# Patient Record
Sex: FEMALE | Race: WHITE | Employment: UNEMPLOYED | URBAN - METROPOLITAN AREA
[De-identification: names, ages, dates, MRNs, and addresses within clinical notes are randomized per-mention and may not be internally consistent; named-entity substitution may affect disease eponyms.]

---

## 2019-07-18 ENCOUNTER — HOSPITAL ENCOUNTER (EMERGENCY)
Age: 29
Discharge: HOME | End: 2019-07-18
Attending: EMERGENCY MEDICINE
Payer: COMMERCIAL

## 2019-07-18 ENCOUNTER — APPOINTMENT (OUTPATIENT)
Dept: CT IMAGING | Age: 29
End: 2019-07-18
Attending: PHYSICIAN ASSISTANT
Payer: COMMERCIAL

## 2019-07-18 VITALS
BODY MASS INDEX: 30.61 KG/M2 | HEART RATE: 61 BPM | SYSTOLIC BLOOD PRESSURE: 106 MMHG | HEIGHT: 67 IN | RESPIRATION RATE: 18 BRPM | TEMPERATURE: 98.2 F | DIASTOLIC BLOOD PRESSURE: 60 MMHG | OXYGEN SATURATION: 99 % | WEIGHT: 195 LBS

## 2019-07-18 DIAGNOSIS — G51.0 BELL'S PALSY: Primary | ICD-10-CM

## 2019-07-18 LAB
ALBUMIN SERPL-MCNC: 3.9 G/DL (ref 3.4–5)
ALBUMIN/GLOB SERPL: 1.4 {RATIO} (ref 0.8–1.7)
ALP SERPL-CCNC: 48 U/L (ref 45–117)
ALT SERPL-CCNC: 18 U/L (ref 13–56)
ANION GAP SERPL CALC-SCNC: 4 MMOL/L (ref 3–18)
APPEARANCE UR: CLEAR
AST SERPL-CCNC: 12 U/L (ref 10–38)
BASOPHILS # BLD: 0 K/UL (ref 0–0.1)
BASOPHILS NFR BLD: 0 % (ref 0–2)
BILIRUB SERPL-MCNC: 0.3 MG/DL (ref 0.2–1)
BILIRUB UR QL: NEGATIVE
BUN SERPL-MCNC: 15 MG/DL (ref 7–18)
BUN/CREAT SERPL: 18 (ref 12–20)
CALCIUM SERPL-MCNC: 8.6 MG/DL (ref 8.5–10.1)
CHLORIDE SERPL-SCNC: 108 MMOL/L (ref 100–111)
CO2 SERPL-SCNC: 30 MMOL/L (ref 21–32)
COLOR UR: YELLOW
CREAT SERPL-MCNC: 0.82 MG/DL (ref 0.6–1.3)
DIFFERENTIAL METHOD BLD: ABNORMAL
EOSINOPHIL # BLD: 0.5 K/UL (ref 0–0.4)
EOSINOPHIL NFR BLD: 4 % (ref 0–5)
ERYTHROCYTE [DISTWIDTH] IN BLOOD BY AUTOMATED COUNT: 12.8 % (ref 11.6–14.5)
GLOBULIN SER CALC-MCNC: 2.8 G/DL (ref 2–4)
GLUCOSE SERPL-MCNC: 88 MG/DL (ref 74–99)
GLUCOSE UR STRIP.AUTO-MCNC: NEGATIVE MG/DL
HCG UR QL: NEGATIVE
HCT VFR BLD AUTO: 37.1 % (ref 35–45)
HGB BLD-MCNC: 12 G/DL (ref 12–16)
HGB UR QL STRIP: NEGATIVE
KETONES UR QL STRIP.AUTO: NEGATIVE MG/DL
LEUKOCYTE ESTERASE UR QL STRIP.AUTO: NEGATIVE
LYMPHOCYTES # BLD: 3.7 K/UL (ref 0.9–3.6)
LYMPHOCYTES NFR BLD: 30 % (ref 21–52)
MCH RBC QN AUTO: 28.6 PG (ref 24–34)
MCHC RBC AUTO-ENTMCNC: 32.3 G/DL (ref 31–37)
MCV RBC AUTO: 88.3 FL (ref 74–97)
MONOCYTES # BLD: 0.9 K/UL (ref 0.05–1.2)
MONOCYTES NFR BLD: 7 % (ref 3–10)
NEUTS SEG # BLD: 7.2 K/UL (ref 1.8–8)
NEUTS SEG NFR BLD: 59 % (ref 40–73)
NITRITE UR QL STRIP.AUTO: NEGATIVE
PH UR STRIP: 7.5 [PH] (ref 5–8)
PLATELET # BLD AUTO: 238 K/UL (ref 135–420)
PMV BLD AUTO: 10.5 FL (ref 9.2–11.8)
POTASSIUM SERPL-SCNC: 3.8 MMOL/L (ref 3.5–5.5)
PROT SERPL-MCNC: 6.7 G/DL (ref 6.4–8.2)
PROT UR STRIP-MCNC: NEGATIVE MG/DL
RBC # BLD AUTO: 4.2 M/UL (ref 4.2–5.3)
SODIUM SERPL-SCNC: 142 MMOL/L (ref 136–145)
SP GR UR REFRACTOMETRY: 1.01 (ref 1–1.03)
UROBILINOGEN UR QL STRIP.AUTO: 1 EU/DL (ref 0.2–1)
WBC # BLD AUTO: 12.2 K/UL (ref 4.6–13.2)

## 2019-07-18 PROCEDURE — 70450 CT HEAD/BRAIN W/O DYE: CPT

## 2019-07-18 PROCEDURE — 99285 EMERGENCY DEPT VISIT HI MDM: CPT

## 2019-07-18 PROCEDURE — 81003 URINALYSIS AUTO W/O SCOPE: CPT

## 2019-07-18 PROCEDURE — 81025 URINE PREGNANCY TEST: CPT

## 2019-07-18 PROCEDURE — 80053 COMPREHEN METABOLIC PANEL: CPT

## 2019-07-18 PROCEDURE — 85025 COMPLETE CBC W/AUTO DIFF WBC: CPT

## 2019-07-18 RX ORDER — PREDNISONE 10 MG/1
TABLET ORAL
Qty: 21 TAB | Refills: 0 | Status: SHIPPED | OUTPATIENT
Start: 2019-07-18 | End: 2021-09-15

## 2019-07-18 RX ORDER — VALACYCLOVIR HYDROCHLORIDE 1 G/1
1000 TABLET, FILM COATED ORAL 3 TIMES DAILY
Qty: 21 TAB | Refills: 0 | Status: SHIPPED | OUTPATIENT
Start: 2019-07-18 | End: 2019-07-25

## 2019-07-18 NOTE — ED PROVIDER NOTES
EMERGENCY DEPARTMENT HISTORY AND PHYSICAL EXAM    Date: 7/18/2019  Patient Name: Medina Alvarenga    History of Presenting Illness     Chief Complaint   Patient presents with    Headache         History Provided By: Patient    Medina Alvarenga is a 33 y.o. female with PMHX of migraine headache who presents to the emergency department C/O right-sided facial droop. Associated sxs include right-sided facial pain. Patient reports approximately 4 to 5 days ago feeling weird sensitivity at the tip of her tongue over the last several days she has noticed a weird sensation to the right side of her face that she feels like the right side of her face is drooping the corner of her mouth and her eyelid. Patient has had no recent illness has not noticed anywhere and rashes no tick exposures that she knows of. She is able to stand and walk appropriately however does feel like over the last couple of days having a hard time reading and comprehending words. Patient's significant other at bedside states that she is speaking normally and seems to not have any altered level of consciousness. Pt denies fever, recent illness, changes in vision syncope chest pain shortness of breath, and any other sxs or complaints. PCP: Will Forte NP        Past History     Past Medical History:  Past Medical History:   Diagnosis Date    Migraine        Past Surgical History:  Past Surgical History:   Procedure Laterality Date    HX GYN      D&C       Family History:  History reviewed. No pertinent family history. Social History:  Social History     Tobacco Use    Smoking status: Current Every Day Smoker     Packs/day: 0.50    Smokeless tobacco: Never Used   Substance Use Topics    Alcohol use: Yes    Drug use: Not on file       Allergies:  No Known Allergies      Review of Systems   Review of Systems   Constitutional: Negative for fever. HENT: Positive for ear pain and facial swelling.  Negative for congestion, dental problem and sore throat. Eyes: Negative for photophobia, pain, discharge and visual disturbance. Respiratory: Negative for cough. Cardiovascular: Negative for chest pain. Gastrointestinal: Negative for nausea and vomiting. Musculoskeletal: Negative for gait problem and myalgias. Skin: Negative for rash and wound. Neurological: Positive for weakness (facial), numbness and headaches. Negative for dizziness, syncope and speech difficulty. All other systems reviewed and are negative. Physical Exam     Vitals:    07/18/19 1812   BP: 136/79   Pulse: 74   Resp: 16   Temp: 98.2 °F (36.8 °C)   SpO2: 100%   Weight: 88.5 kg (195 lb)   Height: 5' 7\" (1.702 m)     Physical Exam   Constitutional: She is oriented to person, place, and time. She appears well-developed and well-nourished. sitting the stretcher appears comfortable in no acute distress, answers questions appropriately   HENT:   Head: Normocephalic and atraumatic. Right Ear: Tympanic membrane and external ear normal.   Left Ear: Tympanic membrane and external ear normal.   Nose: Nose normal.   Mouth/Throat: Oropharynx is clear and moist. No oropharyngeal exudate. Right sided facial droop lagging right lid exam consistent with Bell's palsy   Eyes: Pupils are equal, round, and reactive to light. Conjunctivae and EOM are normal.   Neck: Normal range of motion. Neck supple. Cardiovascular: Normal rate and regular rhythm. Pulmonary/Chest: Effort normal and breath sounds normal.   Musculoskeletal: Normal range of motion. Neurological: She is alert and oriented to person, place, and time. She has normal strength. A cranial nerve deficit (Facial nerve) is present. Coordination and gait normal.   Skin: Skin is warm and dry. Psychiatric: She has a normal mood and affect. Her behavior is normal.   Nursing note and vitals reviewed.         Diagnostic Study Results     Labs -     Recent Results (from the past 12 hour(s))   CBC WITH AUTOMATED DIFF Collection Time: 07/18/19  7:50 PM   Result Value Ref Range    WBC 12.2 4.6 - 13.2 K/uL    RBC 4.20 4. 20 - 5.30 M/uL    HGB 12.0 12.0 - 16.0 g/dL    HCT 37.1 35.0 - 45.0 %    MCV 88.3 74.0 - 97.0 FL    MCH 28.6 24.0 - 34.0 PG    MCHC 32.3 31.0 - 37.0 g/dL    RDW 12.8 11.6 - 14.5 %    PLATELET 762 327 - 703 K/uL    MPV 10.5 9.2 - 11.8 FL    NEUTROPHILS 59 40 - 73 %    LYMPHOCYTES 30 21 - 52 %    MONOCYTES 7 3 - 10 %    EOSINOPHILS 4 0 - 5 %    BASOPHILS 0 0 - 2 %    ABS. NEUTROPHILS 7.2 1.8 - 8.0 K/UL    ABS. LYMPHOCYTES 3.7 (H) 0.9 - 3.6 K/UL    ABS. MONOCYTES 0.9 0.05 - 1.2 K/UL    ABS. EOSINOPHILS 0.5 (H) 0.0 - 0.4 K/UL    ABS. BASOPHILS 0.0 0.0 - 0.1 K/UL    DF AUTOMATED     METABOLIC PANEL, COMPREHENSIVE    Collection Time: 07/18/19  7:50 PM   Result Value Ref Range    Sodium 142 136 - 145 mmol/L    Potassium 3.8 3.5 - 5.5 mmol/L    Chloride 108 100 - 111 mmol/L    CO2 30 21 - 32 mmol/L    Anion gap 4 3.0 - 18 mmol/L    Glucose 88 74 - 99 mg/dL    BUN 15 7.0 - 18 MG/DL    Creatinine 0.82 0.6 - 1.3 MG/DL    BUN/Creatinine ratio 18 12 - 20      GFR est AA >60 >60 ml/min/1.73m2    GFR est non-AA >60 >60 ml/min/1.73m2    Calcium 8.6 8.5 - 10.1 MG/DL    Bilirubin, total 0.3 0.2 - 1.0 MG/DL    ALT (SGPT) 18 13 - 56 U/L    AST (SGOT) 12 10 - 38 U/L    Alk.  phosphatase 48 45 - 117 U/L    Protein, total 6.7 6.4 - 8.2 g/dL    Albumin 3.9 3.4 - 5.0 g/dL    Globulin 2.8 2.0 - 4.0 g/dL    A-G Ratio 1.4 0.8 - 1.7     URINALYSIS W/ RFLX MICROSCOPIC    Collection Time: 07/18/19  7:55 PM   Result Value Ref Range    Color YELLOW      Appearance CLEAR      Specific gravity 1.014 1.005 - 1.030      pH (UA) 7.5 5.0 - 8.0      Protein NEGATIVE  NEG mg/dL    Glucose NEGATIVE  NEG mg/dL    Ketone NEGATIVE  NEG mg/dL    Bilirubin NEGATIVE  NEG      Blood NEGATIVE  NEG      Urobilinogen 1.0 0.2 - 1.0 EU/dL    Nitrites NEGATIVE  NEG      Leukocyte Esterase NEGATIVE  NEG     HCG URINE, QL    Collection Time: 07/18/19  7:55 PM   Result Value Ref Range    HCG urine, QL NEGATIVE  NEG         Radiologic Studies -   CT HEAD WO CONT   Final Result   IMPRESSION:      No acute intracranial abnormalities. CT Results  (Last 48 hours)               07/18/19 2012  CT HEAD WO CONT Final result    Impression:  IMPRESSION:       No acute intracranial abnormalities. Narrative:  EXAM: CT head       CLINICAL HISTORY/INDICATION: Right-sided facial numbness   -Additional: None       COMPARISON: None. TECHNIQUE: Axial CT imaging of the head was performed without intravenous   contrast.  One or more dose reduction techniques were used on this CT: automated   exposure control, adjustment of the mAs and/or kVp according to patient size,   and iterative reconstruction techniques. The specific techniques used on this   CT exam have been documented in the patient's electronic medical record. Digital   Imaging and Communications in Medicine (DICOM) format image data are available   to nonaffiliated external healthcare facilities or entities on a secure, media   free, reciprocally searchable basis with patient authorization for at least a   12-month period after this study. _______________       FINDINGS:       Brain And Posterior Fossa: The sulci, folia, ventricles and basal cisterns are   within normal limits for the patient's age. There is no intracranial hemorrhage,   mass effect, or midline shift. There are no areas of abnormal parenchymal   attenuation. Extra-Axial Spaces And Meninges: There are no abnormal extra-axial fluid   collections. Calvarium: Intact. Sinuses: Clear. Other: None.       _______________               CXR Results  (Last 48 hours)    None          Medications given in the ED-  Medications - No data to display      Medical Decision Making   I am the first provider for this patient.     I reviewed the vital signs, available nursing notes, past medical history, past surgical history, family history and social history. Vital Signs-Reviewed the patient's vital signs. Pulse Oximetry Analysis - 100% on RA     Records Reviewed: Nursing Notes    Procedures:  Procedures    ED Course:   7:13 PM   Initial assessment performed. The patients presenting problems have been discussed, and they are in agreement with the care plan formulated and outlined with them. I have encouraged them to ask questions as they arise throughout their visit.    7:23 PM  Consult with Dr. Kenny Flores to ED attending who evaluate patient    Consult with Dr. Kenny Flores who has evaluated patient agrees with labs and CT head concern as patient has had increasing hard time with reading and focusing on words. plan for work-up and consult with neurology    8:53 PM  Consult Dr. Layo Mccarty neurology who agrees with work-up thus far and discharge plan. No further imaging or testing at this time. Suggest Valtrex and prednisone with follow-up in her office early next week. Discussion: 33 y.o. female Bell's palsy CT scan laboratory diagnostics with no significant findings neurology consulted plan for antivirals steroids and close follow-up. Strict return precautions discussed. Diagnosis and Disposition       DISCHARGE NOTE:  Lidia GALDAMEZ Edda's  results have been reviewed with her. She has been counseled regarding her diagnosis, treatment, and plan. She verbally conveys understanding and agreement of the signs, symptoms, diagnosis, treatment and prognosis and additionally agrees to follow up as discussed. She also agrees with the care-plan and conveys that all of her questions have been answered. I have also provided discharge instructions for her that include: educational information regarding their diagnosis and treatment, and list of reasons why they would want to return to the ED prior to their follow-up appointment, should her condition change. She has been provided with education for proper emergency department utilization.      CLINICAL IMPRESSION:    1. Bell's palsy        PLAN:  1. D/C Home  2. Current Discharge Medication List      START taking these medications    Details   predniSONE (STERAPRED DS) 10 mg dose pack Use as directed  Qty: 21 Tab, Refills: 0      valACYclovir (VALTREX) 1 gram tablet Take 1 Tab by mouth three (3) times daily for 7 days. Qty: 21 Tab, Refills: 0           3. Follow-up Information     Follow up With Specialties Details Why Contact Info    Allyson Chun NP Nurse Practitioner Schedule an appointment as soon as possible for a visit  [address] [place] [address]  [phone]      [facility] EMERGENCY DEPT Emergency Medicine  As needed, If symptoms worsen [address] [place]  [phone]    Scarlet Roberts MD Neurology Go in 1 week call and make f/u appt [address] [place] [address]  [phone]                Please note that this dictation was completed with [facility], the computer voice recognition software. Quite often unanticipated grammatical, syntax, homophones, and other interpretive errors are inadvertently transcribed by the computer software. Please disregard these errors. Please excuse any errors that have escaped final proofreading.

## 2019-07-19 NOTE — DISCHARGE INSTRUCTIONS
Patient Education        Bell's Palsy: Care Instructions  Your Care Instructions    Bell's palsy is paralysis or weakness of the muscles on one side of the face. Often people with Bell's palsy have a droop on one side of the mouth and have trouble completely shutting the eye on the same side. Bell's palsy can also interfere with the sense of taste. These things happen when a nerve in your face becomes inflamed. Bell's palsy is not caused by a stroke. The cause of the nerve inflammation is not known. But some experts think that a virus may cause it. Because of this, doctors sometimes prescribe antiviral medicine to treat it. You also may get medicine to reduce swelling. Bell's palsy usually gets better on its own in a few weeks or months. Follow-up care is a key part of your treatment and safety. Be sure to make and go to all appointments, and call your doctor if you are having problems. It's also a good idea to know your test results and keep a list of the medicines you take. How can you care for yourself at home? · Take your medicines exactly as prescribed. Call your doctor if you think you are having a problem with your medicine. You will get more details on the specific medicines your doctor prescribes. · Use artificial tears or ointment if your eyes are too dry. Bell's palsy can make your lower eyelid droop, causing a dry eye. · If you cannot completely close your eye, consider using an eye patch while you sleep. · Help yourself blink by using your finger to close and open your eyelid. This may help keep your eye moist.  · Wear glasses or goggles to keep dust and dirt out of your eye. · As feeling comes back to your face, massage your forehead, cheeks, and lips. Massage may make the muscles in your face stronger. · Brush and floss your teeth often to help prevent tooth decay. Bell's palsy can dry up the spit on one side of your mouth. This increases the risk of tooth decay.   When should you call for help?  Call 911 anytime you think you may need emergency care. For example, call if:    · You have symptoms of a stroke. These may include:  ? Sudden numbness, tingling, weakness, or loss of movement in your face, arm, or leg, especially on only one side of your body. ? Sudden vision changes. ? Sudden trouble speaking. ? Sudden confusion or trouble understanding simple statements. ? Sudden problems with walking or balance. ? A sudden, severe headache that is different from past headaches.    Call your doctor now or seek immediate medical care if:    · You have numbness or weakness that spreads beyond one side of your face.     · You have a skin rash or eye pain or redness, or light bothers your eyes.     · You have a new or worse headache.    Watch closely for changes in your health, and be sure to contact your doctor if:    · You do not get better as expected. Where can you learn more? Go to http://raymond-blanche.info/. Enter P168 in the search box to learn more about \"Bell's Palsy: Care Instructions. \"  Current as of: Meagan 3, 2018  Content Version: 11.9  © 8250-0276 [employer]. Care instructions adapted under license by [employer] (which disclaims liability or warranty for this information). If you have questions about a medical condition or this instruction, always ask your healthcare professional. [address] any warranty or liability for your use of this information.

## 2019-08-23 ENCOUNTER — HOSPITAL ENCOUNTER (OUTPATIENT)
Dept: MRI IMAGING | Age: 29
Discharge: HOME | End: 2019-08-23
Attending: PSYCHIATRY & NEUROLOGY
Payer: COMMERCIAL

## 2019-08-23 DIAGNOSIS — R51.9 FACIAL PAIN: ICD-10-CM

## 2019-08-23 PROCEDURE — 70553 MRI BRAIN STEM W/O & W/DYE: CPT

## 2019-08-23 PROCEDURE — 74011636320 HC RX REV CODE- 636/320: Performed by: PSYCHIATRY & NEUROLOGY

## 2019-08-23 PROCEDURE — A9575 INJ GADOTERATE MEGLUMI 0.1ML: HCPCS | Performed by: PSYCHIATRY & NEUROLOGY

## 2019-08-23 RX ADMIN — GADOTERATE MEGLUMINE 15 ML: 376.9 INJECTION INTRAVENOUS at 13:19

## 2021-02-02 ENCOUNTER — HOSPITAL ENCOUNTER (OUTPATIENT)
Dept: LAB | Age: 31
Discharge: HOME | End: 2021-02-02
Payer: COMMERCIAL

## 2021-02-02 LAB
ALBUMIN SERPL-MCNC: 3.8 G/DL (ref 3.4–5)
ALBUMIN/GLOB SERPL: 1.3 {RATIO} (ref 0.8–1.7)
ALP SERPL-CCNC: 46 U/L (ref 45–117)
ALT SERPL-CCNC: 13 U/L (ref 13–56)
ANION GAP SERPL CALC-SCNC: 4 MMOL/L (ref 3–18)
AST SERPL-CCNC: 5 U/L (ref 10–38)
BASOPHILS # BLD: 0 K/UL (ref 0–0.1)
BASOPHILS NFR BLD: 0 % (ref 0–2)
BILIRUB SERPL-MCNC: 0.2 MG/DL (ref 0.2–1)
BUN SERPL-MCNC: 14 MG/DL (ref 7–18)
BUN/CREAT SERPL: 17 (ref 12–20)
CALCIUM SERPL-MCNC: 8.7 MG/DL (ref 8.5–10.1)
CHLORIDE SERPL-SCNC: 110 MMOL/L (ref 100–111)
CO2 SERPL-SCNC: 28 MMOL/L (ref 21–32)
CREAT SERPL-MCNC: 0.82 MG/DL (ref 0.6–1.3)
DIFFERENTIAL METHOD BLD: ABNORMAL
EOSINOPHIL # BLD: 0.3 K/UL (ref 0–0.4)
EOSINOPHIL NFR BLD: 3 % (ref 0–5)
ERYTHROCYTE [DISTWIDTH] IN BLOOD BY AUTOMATED COUNT: 14 % (ref 11.6–14.5)
GLOBULIN SER CALC-MCNC: 3 G/DL (ref 2–4)
GLUCOSE SERPL-MCNC: 95 MG/DL (ref 74–99)
HCT VFR BLD AUTO: 37.2 % (ref 35–45)
HGB BLD-MCNC: 11.8 G/DL (ref 12–16)
LYMPHOCYTES # BLD: 2.7 K/UL (ref 0.9–3.6)
LYMPHOCYTES NFR BLD: 27 % (ref 21–52)
MCH RBC QN AUTO: 27.9 PG (ref 24–34)
MCHC RBC AUTO-ENTMCNC: 31.7 G/DL (ref 31–37)
MCV RBC AUTO: 87.9 FL (ref 74–97)
MONOCYTES # BLD: 0.7 K/UL (ref 0.05–1.2)
MONOCYTES NFR BLD: 7 % (ref 3–10)
NEUTS SEG # BLD: 6.3 K/UL (ref 1.8–8)
NEUTS SEG NFR BLD: 63 % (ref 40–73)
PLATELET # BLD AUTO: 267 K/UL (ref 135–420)
PMV BLD AUTO: 11.4 FL (ref 9.2–11.8)
POTASSIUM SERPL-SCNC: 4 MMOL/L (ref 3.5–5.5)
PROT SERPL-MCNC: 6.8 G/DL (ref 6.4–8.2)
RBC # BLD AUTO: 4.23 M/UL (ref 4.2–5.3)
SODIUM SERPL-SCNC: 142 MMOL/L (ref 136–145)
WBC # BLD AUTO: 10 K/UL (ref 4.6–13.2)

## 2021-02-02 PROCEDURE — 85025 COMPLETE CBC W/AUTO DIFF WBC: CPT

## 2021-02-02 PROCEDURE — 80053 COMPREHEN METABOLIC PANEL: CPT

## 2021-02-02 PROCEDURE — 36415 COLL VENOUS BLD VENIPUNCTURE: CPT

## 2021-02-03 LAB
FAX TO INFO,FAXT: NORMAL
FAX TO NUMBER,FAXN: NORMAL

## 2021-09-15 ENCOUNTER — HOSPITAL ENCOUNTER (EMERGENCY)
Age: 31
Discharge: HOME | End: 2021-09-15
Attending: EMERGENCY MEDICINE
Payer: COMMERCIAL

## 2021-09-15 ENCOUNTER — APPOINTMENT (OUTPATIENT)
Dept: CT IMAGING | Age: 31
End: 2021-09-15
Attending: EMERGENCY MEDICINE
Payer: COMMERCIAL

## 2021-09-15 VITALS
WEIGHT: 174 LBS | HEART RATE: 67 BPM | SYSTOLIC BLOOD PRESSURE: 104 MMHG | BODY MASS INDEX: 27.31 KG/M2 | TEMPERATURE: 98.6 F | DIASTOLIC BLOOD PRESSURE: 61 MMHG | RESPIRATION RATE: 18 BRPM | HEIGHT: 67 IN | OXYGEN SATURATION: 99 %

## 2021-09-15 DIAGNOSIS — Z90.710 STATUS POST HYSTERECTOMY: ICD-10-CM

## 2021-09-15 DIAGNOSIS — E86.0 MILD DEHYDRATION: ICD-10-CM

## 2021-09-15 DIAGNOSIS — R53.1 GENERALIZED WEAKNESS: Primary | ICD-10-CM

## 2021-09-15 DIAGNOSIS — K59.00 CONSTIPATION, UNSPECIFIED CONSTIPATION TYPE: ICD-10-CM

## 2021-09-15 LAB
ALBUMIN SERPL-MCNC: 3.4 G/DL (ref 3.4–5)
ALBUMIN/GLOB SERPL: 1.2 {RATIO} (ref 0.8–1.7)
ALP SERPL-CCNC: 51 U/L (ref 45–117)
ALT SERPL-CCNC: 16 U/L (ref 13–56)
ANION GAP SERPL CALC-SCNC: 7 MMOL/L (ref 3–18)
APPEARANCE UR: CLEAR
AST SERPL-CCNC: 10 U/L (ref 10–38)
ATRIAL RATE: 59 BPM
BASOPHILS # BLD: 0 K/UL (ref 0–0.1)
BASOPHILS NFR BLD: 0 % (ref 0–2)
BILIRUB SERPL-MCNC: 0.2 MG/DL (ref 0.2–1)
BILIRUB UR QL: NEGATIVE
BUN SERPL-MCNC: 10 MG/DL (ref 7–18)
BUN/CREAT SERPL: 14 (ref 12–20)
CALCIUM SERPL-MCNC: 8.4 MG/DL (ref 8.5–10.1)
CALCULATED P AXIS, ECG09: 39 DEGREES
CALCULATED R AXIS, ECG10: 51 DEGREES
CALCULATED T AXIS, ECG11: 48 DEGREES
CHLORIDE SERPL-SCNC: 107 MMOL/L (ref 100–111)
CO2 SERPL-SCNC: 26 MMOL/L (ref 21–32)
COLOR UR: YELLOW
CREAT SERPL-MCNC: 0.69 MG/DL (ref 0.6–1.3)
DIAGNOSIS, 93000: NORMAL
DIFFERENTIAL METHOD BLD: ABNORMAL
EOSINOPHIL # BLD: 0.5 K/UL (ref 0–0.4)
EOSINOPHIL NFR BLD: 5 % (ref 0–5)
ERYTHROCYTE [DISTWIDTH] IN BLOOD BY AUTOMATED COUNT: 13 % (ref 11.6–14.5)
GLOBULIN SER CALC-MCNC: 2.8 G/DL (ref 2–4)
GLUCOSE SERPL-MCNC: 101 MG/DL (ref 74–99)
GLUCOSE UR STRIP.AUTO-MCNC: NEGATIVE MG/DL
HCT VFR BLD AUTO: 36.3 % (ref 35–45)
HGB BLD-MCNC: 11.5 G/DL (ref 12–16)
HGB UR QL STRIP: NEGATIVE
KETONES UR QL STRIP.AUTO: NEGATIVE MG/DL
LEUKOCYTE ESTERASE UR QL STRIP.AUTO: NEGATIVE
LIPASE SERPL-CCNC: 79 U/L (ref 73–393)
LYMPHOCYTES # BLD: 2.1 K/UL (ref 0.9–3.6)
LYMPHOCYTES NFR BLD: 21 % (ref 21–52)
MAGNESIUM SERPL-MCNC: 2 MG/DL (ref 1.6–2.6)
MCH RBC QN AUTO: 26.6 PG (ref 24–34)
MCHC RBC AUTO-ENTMCNC: 31.7 G/DL (ref 31–37)
MCV RBC AUTO: 83.8 FL (ref 78–100)
MONOCYTES # BLD: 0.6 K/UL (ref 0.05–1.2)
MONOCYTES NFR BLD: 6 % (ref 3–10)
NEUTS SEG # BLD: 7 K/UL (ref 1.8–8)
NEUTS SEG NFR BLD: 68 % (ref 40–73)
NITRITE UR QL STRIP.AUTO: NEGATIVE
P-R INTERVAL, ECG05: 140 MS
PH UR STRIP: 7.5 [PH] (ref 5–8)
PLATELET # BLD AUTO: 278 K/UL (ref 135–420)
PMV BLD AUTO: 10.7 FL (ref 9.2–11.8)
POTASSIUM SERPL-SCNC: 4.2 MMOL/L (ref 3.5–5.5)
PROT SERPL-MCNC: 6.2 G/DL (ref 6.4–8.2)
PROT UR STRIP-MCNC: NEGATIVE MG/DL
Q-T INTERVAL, ECG07: 410 MS
QRS DURATION, ECG06: 88 MS
QTC CALCULATION (BEZET), ECG08: 405 MS
RBC # BLD AUTO: 4.33 M/UL (ref 4.2–5.3)
SODIUM SERPL-SCNC: 140 MMOL/L (ref 136–145)
SP GR UR REFRACTOMETRY: 1.01 (ref 1–1.03)
UROBILINOGEN UR QL STRIP.AUTO: 0.2 EU/DL (ref 0.2–1)
VENTRICULAR RATE, ECG03: 59 BPM
WBC # BLD AUTO: 10.2 K/UL (ref 4.6–13.2)

## 2021-09-15 PROCEDURE — 85025 COMPLETE CBC W/AUTO DIFF WBC: CPT

## 2021-09-15 PROCEDURE — 96375 TX/PRO/DX INJ NEW DRUG ADDON: CPT

## 2021-09-15 PROCEDURE — 74011000636 HC RX REV CODE- 636: Performed by: EMERGENCY MEDICINE

## 2021-09-15 PROCEDURE — 80053 COMPREHEN METABOLIC PANEL: CPT

## 2021-09-15 PROCEDURE — 83690 ASSAY OF LIPASE: CPT

## 2021-09-15 PROCEDURE — 74011250636 HC RX REV CODE- 250/636: Performed by: EMERGENCY MEDICINE

## 2021-09-15 PROCEDURE — 96361 HYDRATE IV INFUSION ADD-ON: CPT

## 2021-09-15 PROCEDURE — 81003 URINALYSIS AUTO W/O SCOPE: CPT

## 2021-09-15 PROCEDURE — 74177 CT ABD & PELVIS W/CONTRAST: CPT

## 2021-09-15 PROCEDURE — 83735 ASSAY OF MAGNESIUM: CPT

## 2021-09-15 PROCEDURE — 96374 THER/PROPH/DIAG INJ IV PUSH: CPT

## 2021-09-15 PROCEDURE — 99284 EMERGENCY DEPT VISIT MOD MDM: CPT

## 2021-09-15 PROCEDURE — 93005 ELECTROCARDIOGRAM TRACING: CPT

## 2021-09-15 RX ORDER — AMOXICILLIN 250 MG
1 CAPSULE ORAL DAILY
Qty: 7 TABLET | Refills: 0 | Status: SHIPPED | OUTPATIENT
Start: 2021-09-15 | End: 2021-09-22

## 2021-09-15 RX ORDER — NAPROXEN 500 MG/1
TABLET ORAL
COMMUNITY

## 2021-09-15 RX ORDER — MORPHINE SULFATE 4 MG/ML
4 INJECTION INTRAVENOUS
Status: COMPLETED | OUTPATIENT
Start: 2021-09-15 | End: 2021-09-15

## 2021-09-15 RX ORDER — TRAMADOL HYDROCHLORIDE 50 MG/1
TABLET ORAL
COMMUNITY
Start: 2021-09-01

## 2021-09-15 RX ORDER — ONDANSETRON 2 MG/ML
4 INJECTION INTRAMUSCULAR; INTRAVENOUS
Status: COMPLETED | OUTPATIENT
Start: 2021-09-15 | End: 2021-09-15

## 2021-09-15 RX ORDER — OXCARBAZEPINE 300 MG/1
TABLET, FILM COATED ORAL
COMMUNITY

## 2021-09-15 RX ADMIN — MORPHINE SULFATE 4 MG: 4 INJECTION INTRAVENOUS at 15:52

## 2021-09-15 RX ADMIN — ONDANSETRON 4 MG: 2 INJECTION INTRAMUSCULAR; INTRAVENOUS at 15:51

## 2021-09-15 RX ADMIN — IOPAMIDOL 100 ML: 612 INJECTION, SOLUTION INTRAVENOUS at 16:01

## 2021-09-15 RX ADMIN — SODIUM CHLORIDE, SODIUM LACTATE, POTASSIUM CHLORIDE, AND CALCIUM CHLORIDE 1000 ML: 600; 310; 30; 20 INJECTION, SOLUTION INTRAVENOUS at 15:52

## 2021-09-15 NOTE — ED TRIAGE NOTES
Pt states \" I had a total hysterectomy last week in [place]  and she is still feeling really weak lightheaded with abdominal pain and I have just been sleeping a lot along with bouts of nausea. \"

## 2021-09-15 NOTE — ED NOTES
Pt arrives alert and oriented c/o \"abdominal discomfort\" X 2 days s/o hysterectomy 7 days ago. Pt reports she has had a decrease + generalized discomfort 2 days. Pt denies a bowel movement x 7 days. No signs of infection or vaginal bleeding per pt.

## 2021-09-15 NOTE — ED PROVIDER NOTES
[address]  EMERGENCY DEPARTMENT HISTORY AND PHYSICAL EXAM    1:19 PM    Date: 9/15/2021  Patient Name: Kylee Paz    History of Presenting Illness     Chief Complaint   Patient presents with    Post OP Complication       History Provided By: Patient  Location/Duration/Severity/Modifying factors   Patient is a 27-year-old female with a past medical history of endometriosis, trigeminal neuralgia, migraine, who is postop day 7 from a laparoscopic hysterectomy with salpingectomy and excision of endometriomas. She presents with multiple symptoms. She describes most worrisome symptom as generalized weakness, lightheadedness dizziness and mild nausea. She also complains of abdominal pain located primarily near the umbilical port site. The patient has not had any vaginal bleeding or unusual discharge. Describes some vaginal discomfort. Patient reports that she was initially recovering fairly well, it was a same-day surgery and patient reports she had some typical postop symptoms afterwards and then seem to be improving into the last 2 to 3 days on the seem to worsen. She rates the pain is mild to moderate, but fairly persistent. She reports that it is mostly a dull and achy pain, intermittent. She called her surgeon on Monday for a refill on her pain medication, it was completed at [facility] in [place] by Dr. Vida Willett. She has been taking tramadol and naproxen for her pain. She denies any fever although she has had a hot and cold sensation. She reports she tried to call her surgeon today however was not able to get in touch with him. The patient's op note was reviewed it looks like she had an EBL of approximately 75cc.           PCP: None    Current Facility-Administered Medications   Medication Dose Route Frequency Provider Last Rate Last Admin    lactated ringers bolus infusion 1,000 mL  1,000 mL IntraVENous ONCE Berto Client, DO         Current Outpatient Medications   Medication Sig Dispense Refill    naproxen (NAPROSYN) 500 mg tablet naproxen 500 mg tablet   Take 1 tablet twice a day by oral route for 14 days.  OXcarbazepine (TRILEPTAL) 300 mg tablet oxcarbazepine 300 mg tablet   TAKE 2 TABLETS BY MOUTH TWICE A DAY      traMADoL (ULTRAM) 50 mg tablet tramadol 50 mg tablet   Take 2 tablets every 6 hours by oral route as needed.  senna-docusate (Senna with Docusate Sodium) 8.6-50 mg per tablet Take 1 Tablet by mouth daily for 7 days. 7 Tablet 0       Past History     Past Medical History:  Past Medical History:   Diagnosis Date    Endometriosis     Migraine        Past Surgical History:  Past Surgical History:   Procedure Laterality Date    HX GYN      D&C    HX HYSTERECTOMY         Family History:  History reviewed. No pertinent family history. Social History:  Social History     Tobacco Use    Smoking status: Current Every Day Smoker     Packs/day: 0.50    Smokeless tobacco: Never Used   Substance Use Topics    Alcohol use: Yes    Drug use: Not on file       Allergies:  No Known Allergies    I reviewed and confirmed the above information with patient and updated as necessary. Review of Systems     Review of Systems   Constitutional: Positive for fatigue. Negative for chills and fever. HENT: Negative for congestion, rhinorrhea, sinus pressure and sneezing. Eyes: Negative for visual disturbance. Respiratory: Negative for cough and shortness of breath. Cardiovascular: Negative for chest pain. Gastrointestinal: Positive for abdominal pain and nausea. Negative for diarrhea and vomiting. Genitourinary: Positive for vaginal pain. Negative for dysuria, frequency, urgency, vaginal bleeding and vaginal discharge. Musculoskeletal: Negative for back pain and neck pain. Skin: Negative for rash. Neurological: Positive for dizziness and light-headedness. Negative for syncope, numbness and headaches.        Physical Exam Visit Vitals  /61 (BP 1 Location: Left upper arm, BP Patient Position: At rest)   Pulse 67   Temp 98.6 °F (37 °C)   Resp 18   Ht 5' 7\" (1.702 m)   Wt 78.9 kg (174 lb)   SpO2 99%   BMI 27.25 kg/m²       Physical Exam  Vitals and nursing note reviewed. Constitutional:       General: She is not in acute distress. Appearance: Normal appearance. She is normal weight. HENT:      Head: Normocephalic and atraumatic. Right Ear: External ear normal.      Left Ear: External ear normal.      Nose: Nose normal. No congestion. Mouth/Throat:      Mouth: Mucous membranes are dry. Pharynx: Oropharynx is clear. No oropharyngeal exudate or posterior oropharyngeal erythema. Comments: Mildly dry and tacky oral mucosa  Eyes:      General: No scleral icterus. Conjunctiva/sclera: Conjunctivae normal.      Pupils: Pupils are equal, round, and reactive to light. Cardiovascular:      Rate and Rhythm: Normal rate and regular rhythm. Pulses: Normal pulses. Heart sounds: Normal heart sounds. No murmur heard. Pulmonary:      Effort: Pulmonary effort is normal.      Breath sounds: Normal breath sounds. No wheezing, rhonchi or rales. Abdominal:      General: Abdomen is flat. Tenderness: There is abdominal tenderness (Generalized tenderness in the periumbilical and suprapubic region. ). There is no guarding or rebound. Comments: Laparoscopic incision sites are clean, dry and intact and closed. There is no dehiscence. Musculoskeletal:         General: No swelling or tenderness. Normal range of motion. Cervical back: Normal range of motion and neck supple. Right lower leg: No edema. Left lower leg: No edema. Skin:     General: Skin is warm and dry. Capillary Refill: Capillary refill takes less than 2 seconds. Findings: No rash. Neurological:      General: No focal deficit present. Mental Status: She is alert.          Diagnostic Study Results Labs -  Recent Results (from the past 24 hour(s))   URINALYSIS W/ RFLX MICROSCOPIC    Collection Time: 09/15/21  1:30 PM   Result Value Ref Range    Color YELLOW      Appearance CLEAR      Specific gravity 1.014 1.005 - 1.030      pH (UA) 7.5 5.0 - 8.0      Protein Negative NEG mg/dL    Glucose Negative NEG mg/dL    Ketone Negative NEG mg/dL    Bilirubin Negative NEG      Blood Negative NEG      Urobilinogen 0.2 0.2 - 1.0 EU/dL    Nitrites Negative NEG      Leukocyte Esterase Negative NEG     EKG, 12 LEAD, INITIAL    Collection Time: 09/15/21  1:58 PM   Result Value Ref Range    Ventricular Rate 59 BPM    Atrial Rate 59 BPM    P-R Interval 140 ms    QRS Duration 88 ms    Q-T Interval 410 ms    QTC Calculation (Bezet) 405 ms    Calculated P Axis 39 degrees    Calculated R Axis 51 degrees    Calculated T Axis 48 degrees    Diagnosis       Sinus bradycardia  Otherwise normal ECG  No previous ECGs available     CBC WITH AUTOMATED DIFF    Collection Time: 09/15/21  2:02 PM   Result Value Ref Range    WBC 10.2 4.6 - 13.2 K/uL    RBC 4.33 4.20 - 5.30 M/uL    HGB 11.5 (L) 12.0 - 16.0 g/dL    HCT 36.3 35.0 - 45.0 %    MCV 83.8 78.0 - 100.0 FL    MCH 26.6 24.0 - 34.0 PG    MCHC 31.7 31.0 - 37.0 g/dL    RDW 13.0 11.6 - 14.5 %    PLATELET 913 704 - 481 K/uL    MPV 10.7 9.2 - 11.8 FL    NEUTROPHILS 68 40 - 73 %    LYMPHOCYTES 21 21 - 52 %    MONOCYTES 6 3 - 10 %    EOSINOPHILS 5 0 - 5 %    BASOPHILS 0 0 - 2 %    ABS. NEUTROPHILS 7.0 1.8 - 8.0 K/UL    ABS. LYMPHOCYTES 2.1 0.9 - 3.6 K/UL    ABS. MONOCYTES 0.6 0.05 - 1.2 K/UL    ABS. EOSINOPHILS 0.5 (H) 0.0 - 0.4 K/UL    ABS.  BASOPHILS 0.0 0.0 - 0.1 K/UL    DF AUTOMATED     METABOLIC PANEL, COMPREHENSIVE    Collection Time: 09/15/21  2:02 PM   Result Value Ref Range    Sodium 140 136 - 145 mmol/L    Potassium 4.2 3.5 - 5.5 mmol/L    Chloride 107 100 - 111 mmol/L    CO2 26 21 - 32 mmol/L    Anion gap 7 3.0 - 18 mmol/L    Glucose 101 (H) 74 - 99 mg/dL    BUN 10 7.0 - 18 MG/DL Creatinine 0.69 0.6 - 1.3 MG/DL    BUN/Creatinine ratio 14 12 - 20      GFR est AA >60 >60 ml/min/1.73m2    GFR est non-AA >60 >60 ml/min/1.73m2    Calcium 8.4 (L) 8.5 - 10.1 MG/DL    Bilirubin, total 0.2 0.2 - 1.0 MG/DL    ALT (SGPT) 16 13 - 56 U/L    AST (SGOT) 10 10 - 38 U/L    Alk. phosphatase 51 45 - 117 U/L    Protein, total 6.2 (L) 6.4 - 8.2 g/dL    Albumin 3.4 3.4 - 5.0 g/dL    Globulin 2.8 2.0 - 4.0 g/dL    A-G Ratio 1.2 0.8 - 1.7     LIPASE    Collection Time: 09/15/21  2:02 PM   Result Value Ref Range    Lipase 79 73 - 393 U/L   MAGNESIUM    Collection Time: 09/15/21  2:02 PM   Result Value Ref Range    Magnesium 2.0 1.6 - 2.6 mg/dL         Radiologic Studies -   CT ABD PELV W CONT   Final Result      No acute intra-abdominal abnormality. Status post hysterectomy. Trace pelvic free fluid. Medical Decision Making   I am the first provider for this patient. I reviewed the vital signs, available nursing notes, past medical history, past surgical history, family history and social history. Vital Signs-Reviewed the patient's vital signs. EKG: See ED course for my interpretation of EKG(s). Records Reviewed: Nursing Notes, Old Medical Records, Previous Radiology Studies and Previous Laboratory Studies (Time of Review: 1:19 PM)        Provider Notes (Medical Decision Making):   MDM  Number of Diagnoses or Management Options  Constipation, unspecified constipation type  Generalized weakness  Mild dehydration  Status post hysterectomy  Diagnosis management comments: 32-year-old female presenting with multiple symptoms after she underwent a total abdominal hysterectomy which is laparoscopic, performed in [place]. This was done for endometriosis. She complains of lightheadedness, dizziness, nausea and abdominal pain postop day 7 after a laparoscopic hysterectomy.     Her differential diagnosis would include routine postop symptoms and care, abdominal abscess, perforated bowel less likely, hematoma, seroma, internal dehiscence, dehydration, cardiac arrhythmia, electrolyte derangement, etc. The patient is denying any significant chest pain or any shortness of breath could be more suggestive of potentially a pulmonary embolism. Results reviewed and patient is reassessed overall feels better with fluid. She notes she has been quite constipated and has not had a bowel movement since the surgery. This could relate to her pain. The work-up is unremarkable. The patient was discussed with the patient's surgeon, please see the ED course section. States doing a pelvic exam without any clear indication such as a gush of fluid or bleeding. The patient will be discharged home to follow-up with her surgeon in a few days. She has an appointment for the 23rd but he advised patient to come sooner if she is still not feeling well. Patient expresses under plan and all questions were answered. Stable for discharge home. At this time, patient is stable and appropriate for discharge home.  Patient demonstrates understanding of current diagnoses and is in agreement with the treatment plan. [place] are advised that while the likelihood of serious underlying condition is low at this point given the evaluation performed today, we cannot fully rule it out. [place] are advised to immediately return with any new symptoms or worsening of current condition. Any Incidental findings were noted on the patient's discharge paperwork as well as verbally directly to the patient, and the appropriate follow-up was given to the patient as far as instructions on testing needed as well as the timeframe.  All questions have been answered. Lucinda Storey is given educational material regarding their diagnoses, including danger symptoms and when to return to the ED. This note was dictated utilizing Dragon voice recognition software. Unfortunately this leads to occasional typographical errors.  I apologize in advance if the situation occurs. If questions occur please do not hesitate to contact me directly. Candy Wood DO            ED Course: Progress Notes, Reevaluation, and Consults:  ED Course as of Sep 15 2035   Wed Sep 15, 2021   1551 EKG interpretation 9/15/2021, 1358. Sinus bradycardia rate of 59 beats minute, normal axis normal intervals. No ST elevation or depression. No T wave inversions. Overall sinus bradycardia without any acute changes. [OLENA]   8387 Discussed with Dr. Gus Coronado of the OB/GYN service who performed patient's hysterectomy. Reviewed results. Overall reassuring work-up. Recommended against doing a pelvic exam absence of any strong indications such as gush of vaginal fluid or gush of blood. Advised to have patient follow-up outpatient    [OLENA]      ED Course User Index  [OLENA] Toy Vidal DO       Procedures    Critical Care Time: N/A    Diagnosis     Clinical Impression:   1. Generalized weakness    2. Mild dehydration    3. Constipation, unspecified constipation type    4. Status post hysterectomy        Disposition: Discharge     Follow-up Information     Follow up With Specialties Details Why Contact Info    Your Primary Care Physician  In 3 days      Amandeep Zhang MD Obstetrics & Gynecology Call in 1 day  [address] [phone]      [facility] Emergency Medicine  As needed, If symptoms worsen; or [facility] Emergency Department [address]  [phone]           Discharge Medication List as of 9/15/2021  5:06 PM      START taking these medications    Details   senna-docusate (Senna with Docusate Sodium) 8.6-50 mg per tablet Take 1 Tablet by mouth daily for 7 days. , Normal, Disp-7 Tablet, R-0         CONTINUE these medications which have NOT CHANGED    Details   naproxen (NAPROSYN) 500 mg tablet naproxen 500 mg tablet   Take 1 tablet twice a day by oral route for 14 days. , Historical Med      OXcarbazepine (TRILEPTAL) 300 mg tablet oxcarbazepine 300 mg tablet   TAKE 2 TABLETS BY MOUTH TWICE A DAY, Historical Med      traMADoL (ULTRAM) 50 mg tablet tramadol 50 mg tablet   Take 2 tablets every 6 hours by oral route as needed., [address] Romero DO   Emergency Medicine   September 15, 2021, 1:20 PM     This note is dictated utilizing Dragon voice recognition software. Unfortunately this leads to occasional typographical errors using the voice recognition. I apologize in advance if the situation occurs. If questions occur please do not hesitate to contact me directly.     Cliff , DO

## 2022-01-13 ENCOUNTER — TRANSCRIBE ORDER (OUTPATIENT)
Dept: SCHEDULING | Age: 32
End: 2022-01-13

## 2022-01-13 DIAGNOSIS — R42 DIZZINESS AND GIDDINESS: Primary | ICD-10-CM

## 2022-01-21 ENCOUNTER — HOSPITAL ENCOUNTER (OUTPATIENT)
Dept: MRI IMAGING | Age: 32
Discharge: HOME | End: 2022-01-21
Attending: PSYCHIATRY & NEUROLOGY
Payer: COMMERCIAL

## 2022-01-21 DIAGNOSIS — R42 DIZZINESS AND GIDDINESS: ICD-10-CM

## 2022-01-21 PROCEDURE — 70551 MRI BRAIN STEM W/O DYE: CPT

## 2022-03-22 ENCOUNTER — HOSPITAL ENCOUNTER (OUTPATIENT)
Dept: PHYSICAL THERAPY | Age: 32
Discharge: HOME | End: 2022-03-22
Payer: COMMERCIAL

## 2022-03-22 PROCEDURE — 97162 PT EVAL MOD COMPLEX 30 MIN: CPT

## 2022-03-22 PROCEDURE — 97112 NEUROMUSCULAR REEDUCATION: CPT

## 2022-03-22 PROCEDURE — 97530 THERAPEUTIC ACTIVITIES: CPT

## 2022-03-22 NOTE — PROGRESS NOTES
PT DAILY TREATMENT NOTE/VESTIBULAR EVAL     Patient Name: Serena Quiroz  Date:3/22/2022  : 1990  [x]  Patient  Verified  Payor: BLUE CROSS / Plan: Jess Howard [id number] PPO / Product Type: PPO /    In time:2:17  Out time:3:00  Total Treatment Time (min): 43  Visit #: 1 of 12    Medicare/BCBS Only   Total Timed Codes (min):  16 1:1 Treatment Time:  43     Treatment Area: Vertigo [R42]    SUBJECTIVE  Pain Level (0-10 scale): NA  []constant []intermittent []improving []worsening []no change since onset    Any medication changes, allergies to medications, adverse drug reactions, diagnosis change, or new procedure performed?: [x] No    [] Yes (see summary sheet for update)  Subjective functional status/changes:     PLOF: more concerned with going out alone when out in a public, don;t walk dogs as often due to balance,unable to wear heels    Mechanism of Injury: 2021 was shopping and gradual onset, disorientation. MRI was unremarkable. Topomax was given was not able to read and comprehend. So stopped. F/u in . Staci Askew down stairs 2x  and 2022, almost fell off ladder last week due to dizziness. Migraines 1x week needing to take meds - meds do help.  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 Nausea (no), vomiting (no), LOB (yes), oscillopsia (yes), HA (no), diplopia (yes), visual loss (no), dysarthria (yes), sensory disturbances (no), limb incoordination (no), falls (yes), hiccups (no)    What relieves your symptoms?: no    Does sneezing, coughing, holding your breath or specific sounds excaerbate symptoms? ( superior canal dehiscence): no    Associated light sensitivity, sounds or odors with dizziness? Horminally triggered?, HA? ( migraine related dizziness):  no    How often does an episode occur?: everyday 2x, sometimes more     Previous Treatment/Compliance: no  PMHx/Surgical Hx: social drinking, depression, smoke clove cigareets 1-2x week having one, hysterectomy September 2021   Work Hx: , 2 partners and dogs   Living Situation: 2 story home  Pt Goals: \"answers or a fix. \"      OBJECTIVE/EXAMINATION      27 min [x]Eval                  []Re-Eval       8 min Therapeutic Activity:  []  See flow sheet : pt education on exam findings, causes of dizziness, balance systems, hypofunction and treatment    Rationale: improve coordination, improve balance and increase proprioception  to improve the patients ability to perform daily activities with decreased pain and symptom levels     8 min Neuromuscular Re-education:  []  See flow sheet : VOR x1 seated with education on speed, to stop if dizziness becomes > 5/10   Rationale: increase strength, improve coordination, improve balance and increase proprioception  to improve the patients ability to perform daily activities with decreased pain and symptom levels        With   [] TE   [] TA   [] neuro   [] other: Patient Education: [x] Review HEP    [] Progressed/Changed HEP based on:   [] positioning   [] body mechanics   [] transfers   [] heat/ice application    [] other:      Other Objective/Functional Measures:     Posture:  [x] WNL [] Forward head    [] Protracted shoulders    [] Retracted shoulders  [] Kyphosis:  [] increased   [] decreased   [] Lordosis:   [] increased   [] decreased  Other:    C/S ROM: [x] WFL    Cervical flexion/extension:   Rotation:   SB:      Strength: UE  [x] WFL    [] Limited    Describe:                   LE   [x] WFL    [] Limited    Describe:    Optional Tests:  Sensation:  [x] Intact [] Diminished    Describe:    Proprioception: [] Intact [] Diminished    Describe:    Coordination Testing:       Disdiadochokinesia [x] WFL    [] Impaired    Describe:       Heel - Shin  [facility]    [] Impaired    Describe:       FNF   [x] [facility]    [] Impaired    Describe: Toe Tap   [x] [facility]    [] Impaired    Describe:    Oculomotor Tests: (Fixation Not Blocked)       Ocular ROM:   [x] WFL    [] Limited    Describe:       Spontaneous Nystag. [x] Neg     [] Pos    [] Left    [] Right       Gaze Holding Nystag. [x] Neg     [] Pos    [] Left    [] Right        Smooth Pursuit  [x] Neg     [] Pos    [] Left    [] Right        Saccades   [x] Neg     [] Pos    [] Left    [] Right        VOR - Slow Head Mvmt [x] Neg     [] Pos    [] Left    [] Right        VOR - Fast Head Mvmt [x] Neg     [] Pos    [] Left    [] Right        Head Thrust  [] Neg     [x] Pos    [] Left    [x] Right        Dynamic Visual Acuity [] Neg     [] Pos    [] Left    [] Right     Other Special Tests:       Vertebral Artery Testing [] Neg     [] Pos    [] Left    [] Right       Hallpike-Adolfo Maneuver [] Neg     [] Pos    [] Left    [] Right       Roll Test   [] Neg     [] Pos    [] Left    [] Right       Murphy Balance Scale [] Neg     [] Pos    Score:        FGA                                   [] Neg     [] Pos    Score:       TUG   Score:       5x sit to stand   Score:        Sasha Kaba step test  [] Neg     [] Pos      Balance Standard Testing (Eyes Open/Eyes Closed - EO/EC)       Romberg EO  [x] WFL    [] Pos    Describe:       Romberg EC    [x] [facility]    [] Pos Describe:           Stand on Foam  [] WFL    [x] Pos    Describe:  feeling wobbly however NBOS 30 seconds EC        Sharpened Romberg EO [] WFL    [] Pos    Describe:       Sharpened Romberg EC [] WFL    [] Pos    Describe:        Single Leg Stand  [x] [facility]    [] Pos    Describe: Motion Sensitivity:  [] Neg     [] Pos    Describe: Other Tests:  Convergence: 30cm - wear glasses for lazy eye  Cervicogenic:  [] Neg     [] Pos    [] Left    [] Right    Pain Level (0-10 scale) post treatment: NA    ASSESSMENT/Changes in Function: Pt is a 31yo female presenting to therapy with c/c dizziness ongoing since December 2021 with insidious onset. Pt has hx of migraines and able to manage with medication as well [place] palsy affecting right side last year. Pt reporting no increase in dizziness pre or post migraine. Pt describes dizziness as imbalance and unsteadiness with having difficulty concentrating and speaking during lasting seconds to minutes. Pt reporting episodes occur 2x daily with no known trigger. Pt reporting 2 falls this year with managing stairs due to LOB and dizziness. Pt reporting having lazy eye as well with reporting some double vision. MRI was unremarkable. Pt demonstrates WFL cervical ROM, WFL UE and LE strength, negative central signs, negative positional vertigo, positive head thurst to right and reporting some dizziness with fast VOR after, positive for instability on foam with EC however negative Romberg. . Signs and symptoms consistent with possible right vestibular hypofunction. Pt would benefit from skilled PT services to address the above impairments to allow pt to complete ADLs with increased independence and decrease fear of leaving house alone due to imbalance.        Patient will continue to benefit from skilled PT services to modify and progress therapeutic interventions, address functional mobility deficits, analyze and cue movement patterns, analyze and modify body mechanics/ergonomics, assess and modify postural abnormalities, address imbalance/dizziness and instruct in home and community integration to attain remaining goals. []  See Plan of Care  []  See progress note/recertification  []  See Discharge Summary         Progress towards goals / Updated goals:  Short Term Goals: STG- To be accomplished in 2 week(s):  1. Pt will be independent with HEP to encourage prophylaxis. Eval: HEP dispensed     Long Term Goals: LTG- To be accomplished in 6 week(s):  1. Pt will report >/=80% improvement in symptoms to be able to complete ADLs with increased ease. Eval: episodes 2x day with spontaneous onset, losing balance     2. Pt will be able to complete SL balance on foam EC for 10 seconds to demonstrate improved vestibular function for balance. Eval:instability with NBOS however no LOB    3. Pt FOTO score will increase by >/= 8 points to show improvement in subjective function.   Eval:47  Current: will address at visit 5  *FOTO score is an established functional score where 100 = no disability*      PLAN  []  Upgrade activities as tolerated     [x]  Continue plan of care  []  Update interventions per flow sheet       []  Discharge due to:_  []  Other:_      Anahy Oats 3/22/2022  1:25 PM

## 2022-03-22 NOTE — PROGRESS NOTES
[facility] [address], [place] Claudine faulkner, [address] [facility]  Phone: [phone]      Fax:  [phone]             Plan of Care/ Statement of Necessity for Physical Therapy Services      Patient name: Elis Goldstein Start of Care: 3/22/2022   Referral source: Stacie Espinoza MD : 1990    Medical Diagnosis: Vertigo [R42]   Onset Date:2021    Treatment Diagnosis: vertigo   Prior Hospitalization: see medical history Provider#: [id number]   Medications: Verified on Patient summary List    Comorbidities: social drinking, depression, smoke clove cigareets 1-2x week having one, hysterectomy 2021   Prior Level of Function: , able to leave home without fear of being alone due to balance       The Plan of Care and following information is based on the information from the initial evaluation. Assessment/ key information: Pt is a 33yo female presenting to therapy with c/c dizziness ongoing since 2021 with insidious onset. Pt has hx of migraines and able to manage with medication as well [place] palsy affecting right side last year. Pt reporting no increase in dizziness pre or post migraine. Pt describes dizziness as imbalance and unsteadiness with having difficulty concentrating and speaking during lasting seconds to minutes. Pt reporting episodes occur 2x daily with no known trigger. Pt reporting 2 falls this year with managing stairs due to LOB and dizziness. Pt reporting having lazy eye as well with reporting some double vision. MRI was unremarkable. Pt demonstrates WFL cervical ROM, WFL UE and LE strength, negative central signs, negative positional vertigo, positive head thurst to right and reporting some dizziness with fast VOR after, positive for instability on foam with EC however negative Romberg. . Signs and symptoms consistent with possible right vestibular hypofunction.  Pt would benefit from skilled PT services to address the above impairments to allow pt to complete ADLs with increased independence and decrease fear of leaving house alone due to imbalance. Evaluation Complexity History HIGH Complexity :3+ comorbidities / personal factors will impact the outcome/ POC ; Examination MEDIUM Complexity : 3 Standardized tests and measures addressing body structure, function, activity limitation and / or participation in recreation  ;Presentation MEDIUM Complexity : Evolving with changing characteristics  ; Clinical Decision Making MEDIUM Complexity : FOTO score of 26-74 FOTO score = an established functional score where 100 = no disability  Overall Complexity Rating: MEDIUM  Problem List: impaired gait/ balance, decrease ADL/ functional abilitiies, decrease activity tolerance, decrease flexibility/ joint mobility and decrease transfer abilities   Treatment Plan may include any combination of the following: Therapeutic exercise, Therapeutic activities, Neuromuscular re-education, Physical agent/modality, Gait/balance training, Patient education, Self Care training, Functional mobility training, Home safety training and Stair training    Patient / Family readiness to learn indicated by: asking questions, trying to perform skills and interest  Persons(s) to be included in education: patient (P)  Barriers to Learning/Limitations: None  Patient Goal (s): answers or a fix. \"  Patient Self Reported Health Status: good  Rehabilitation Potential: good    Short Term Goals: STG- To be accomplished in 2 week(s):  1. Pt will be independent with HEP to encourage prophylaxis. Eval: HEP dispensed      Long Term Goals: LTG- To be accomplished in 6 week(s):  1. Pt will report >/=80% improvement in symptoms to be able to complete ADLs with increased ease. Eval: episodes 2x day with spontaneous onset, losing balance      2.   Pt will be able to complete SL balance on foam EC for 10 seconds to demonstrate improved vestibular function for balance. Eval:instability with NBOS however no LOB     3. Pt FOTO score will increase by >/= 8 points to show improvement in subjective function. Eval:47  Current: will address at visit 5  *FOTO score is an established functional score where 100 = no disability*    Frequency / Duration: Patient to be seen 2 times per week for 6 weeks. Patient/ Caregiver education and instruction: Diagnosis, prognosis, self care, activity modification and exercises   [x]  Plan of care has been reviewed with EUSEBIO QUIROZ Remesic 3/22/2022 1:26 PM  _____________________________________________________________________  I certify that the above Therapy Services are being furnished while the patient is under my care. I agree with the treatment plan and certify that this therapy is necessary.     [address] [facility] Signature:____________Date:_________TIME:________                                      Nunu Marin MD    ** Signature, Date and Time must be completed for valid certification **    Please sign and return to [facility] at the [address], [address] [place]       Phone: [phone]      Fax:  [phone]

## 2022-03-29 ENCOUNTER — HOSPITAL ENCOUNTER (OUTPATIENT)
Dept: PHYSICAL THERAPY | Age: 32
Discharge: HOME | End: 2022-03-29
Payer: COMMERCIAL

## 2022-03-29 PROCEDURE — 97110 THERAPEUTIC EXERCISES: CPT

## 2022-03-29 PROCEDURE — 97530 THERAPEUTIC ACTIVITIES: CPT

## 2022-03-29 PROCEDURE — 97112 NEUROMUSCULAR REEDUCATION: CPT

## 2022-03-29 NOTE — PROGRESS NOTES
PT DAILY TREATMENT NOTE    Patient Name: Marcia Sevilla  Date:3/29/2022  : 1990  [x]  Patient  Verified  Payor: BLUE CROSS / Plan: Jess Howard [id number] PPO / Product Type: PPO /    In time:3:01  Out time:3:46  Total Treatment Time (min): 45  Total Timed Codes (min): 45  1:1 Treatment Time (MC/BCBS only): 45   Visit #: 2 of 12    Treatment Dx: Vertigo [R42]    SUBJECTIVE  Pain Level (0-10 scale): pain  N/a, no dizziness  Any medication changes, allergies to medications, adverse drug reactions, diagnosis change, or new procedure performed?: [x] No    [] Yes (see summary sheet for update)  Subjective functional status/changes:   [] No changes reported  Reports she had some dizziness earlier this morning. Reports that she was doing laundry, walking straight and noticed she was going crooked and sat on bed to recover.      OBJECTIVE    10 min Therapeutic Exercise:  [] See flow sheet :   Rationale: increase ROM, increase strength, improve coordination, improve balance and increase proprioception to improve the patients ability to perform daily activities with decreased pain and symptom levels      15 min Therapeutic Activity:  []  See flow sheet :   Rationale: increase ROM, increase strength, improve coordination, improve balance and increase proprioception  to improve the patients ability to perform daily activities with decreased pain and symptom levels       20 min Neuromuscular Re-education:  []  See flow sheet :   Rationale: increase ROM, increase strength, improve coordination, improve balance and increase proprioception  to improve the patients ability to perform daily activities with decreased pain and symptom levels            With   [x] TE   [x] TA   [x] neuro   [] other: Patient Education: [x] Review HEP    [] Progressed/Changed HEP based on:   [] positioning   [] body mechanics   [] transfers   [] heat/ice application    [x] other: educated pt on vestibular hypofunction     Other Objective/Functional Measures: Pt enters gym in no apparent distress. FGA: 25/30  Pin push-ups: 1st time: 23\" 2nd: 30\"- 55 second recovery  VORx1  Horizontal \"feel like spinning is going to come on\" dizziness 3-5/10, 15\" recovery   VORx1 vertical 28\" before requiring break; 1:13\" recovery time  2 targets: 2x30\", \"lack confidence to walk a straight line\"  VORx2 1st time: 30 seconds: dizzy 2-3/10       Pain Level (0-10 scale) post treatment: no pain    ASSESSMENT/Changes in Function: Patient tolerated therapy session well as there were no adverse reactions today. Performed VORx1 vertical and horizontal and required breaks in between the 30 seconds. Pt educated on breathing techniques to relax and to get to base line. Pt is progressing with therapy as indicated by pt tolerating increase in exercise repetitions and resistance. Although showing progress patient would benefit from continuation of skilled physical therapy to address the remaining limitations. Patient will continue to benefit from skilled PT services to modify and progress therapeutic interventions, address functional mobility deficits, address ROM deficits, address strength deficits, analyze and address soft tissue restrictions, analyze and cue movement patterns, analyze and modify body mechanics/ergonomics, assess and modify postural abnormalities, address imbalance/dizziness and instruct in home and community integration to attain remaining goals. [x]  See Plan of Care  []  See progress note/recertification  []  See Discharge Summary         Progress towards goals / Updated goals:  Short Term Goals: STG- To be accomplished in 2 week(s):  1.  Pt will be independent with HEP to encourage prophylaxis. Eval: HEP dispensed   Current: 3/29/22 reviewed updated per chart-progressing     Long Term Goals: LTG- To be accomplished in 6 week(s):  1.  Pt will report >/=80% improvement in symptoms to be able to complete ADLs with increased ease. Eval: episodes 2x day with spontaneous onset, losing balance      2.  Pt will be able to complete SL balance on foam EC for 10 seconds to demonstrate improved vestibular function for balance. Eval:instability with NBOS however no LOB     3.  Pt FOTO score will increase by >/= 8 points to show improvement in subjective function.   Eval:47  Current: 3/29/22 will address at visit 5  *FOTO score is an established functional score where 100 = no disability*    PLAN  [x]  Upgrade activities as tolerated     [x]  Continue plan of care  []  Update interventions per flow sheet       []  Discharge due to:_  []  Other:_      Nick Alejandro, PT , DPT, [facility] 3/29/2022  12:24 PM    Future Appointments   Date Time Provider Claudine Moy   3/29/2022  3:00 PM Keyshawn Randall, PT [facility]   3/31/2022  3:45 PM Remesic, Anam Myrtle [facility]   4/6/2022  2:15 PM Valentine Cos, PT [facility]   4/12/2022 10:45 AM Remesic, Anam Myrtle [facility]   4/15/2022 10:45 AM Valentine Cos, PT [facility]   4/19/2022 10:45 AM Remesic, Anam Myrtle [facility]   4/22/2022 12:15 PM Remesic, Anam Myrtle [facility]   4/26/2022  1:30 PM Remesic, Anam Emporia [facility]   4/29/2022 12:15 PM Remesic, Anam [place]ia [facility]

## 2022-03-31 ENCOUNTER — HOSPITAL ENCOUNTER (OUTPATIENT)
Dept: PHYSICAL THERAPY | Age: 32
Discharge: HOME | End: 2022-03-31
Payer: COMMERCIAL

## 2022-03-31 PROCEDURE — 97530 THERAPEUTIC ACTIVITIES: CPT

## 2022-03-31 PROCEDURE — 97112 NEUROMUSCULAR REEDUCATION: CPT

## 2022-03-31 NOTE — PROGRESS NOTES
PT DAILY TREATMENT NOTE    Patient Name: Juan Austin  Date:3/31/2022  : 1990  [x]  Patient  Verified  Payor: Leoncio Billingsley / Plan: Jess Howard [id number] PPO / Product Type: PPO /    In time:3:48  Out time:4:41  Total Treatment Time (min): 53  Total Timed Codes (min): 53  1:1 Treatment Time (MC/BCBS only): 53   Visit #: 3 of 12    Treatment Dx: Vertigo [R42]    SUBJECTIVE  Pain Level (0-10 scale): NA   Any medication changes, allergies to medications, adverse drug reactions, diagnosis change, or new procedure performed?: [x] No    [] Yes (see summary sheet for update)  Subjective functional status/changes:   [] No changes reported  Pt reporting 5/10 max dizziness after leaving, feeling foggy after. Pt reporting just laying in bed.      OBJECTIVE    28 min Therapeutic Activity:  [x]  See flow sheet :   Rationale: increase strength, improve coordination and increase proprioception  to improve the patients ability to perform daily activities with decreased pain and symptom levels     25 min Neuromuscular Re-education:  [x]  See flow sheet :   Rationale: increase strength, improve coordination, improve balance and increase proprioception  to improve the patients ability to perform daily activities with decreased pain and symptom levels    With   [] TE   [] TA   [] neuro   [] other: Patient Education: [x] Review HEP    [] Progressed/Changed HEP based on:   [] positioning   [] body mechanics   [] transfers   [] heat/ice application    [] other:      Other Objective/Functional Measures:   3/10 max dizziness during session  20 - 45 seconds recovery time with VOR x1  Challenged with staying focussed on letter with smooth pursuit   Less dizziness with saccades vertical with object closer  No dizziness with 2 targets with slow speed     Pain Level (0-10 scale) post treatment: NA    ASSESSMENT/Changes in Function: Pt with continued difficulty with vestibular rehab interventions with increased dizziness up to 3/10 and not able to complete full 30 seconds for interventions however < 1min recovery time noted today. Pt more challenged with vertical then horizontal head with some improvement in tolerance with object closer. Pt reporting seeing double vision with saccades however decreased with object closer. Patient will continue to benefit from skilled PT services to modify and progress therapeutic interventions, address functional mobility deficits, address strength deficits, analyze and cue movement patterns, analyze and modify body mechanics/ergonomics, assess and modify postural abnormalities, address imbalance/dizziness and instruct in home and community integration to attain remaining goals. [x]  See Plan of Care  []  See progress note/recertification  []  See Discharge Summary         Progress towards goals / Updated goals:  Short Term Goals: STG- To be accomplished in 2 week(s):  1.  Pt will be independent with HEP to encourage prophylaxis. Eval: HEP dispensed   Current: compliance 2-3x day progressing 3/31/21     Long Term Goals: LTG- To be accomplished in 6 week(s):  1.  Pt will report >/=80% improvement in symptoms to be able to complete ADLs with increased ease. Eval: episodes 2x day with spontaneous onset, losing balance   Current: continues to c/o fogginess, 5/10 max dizziness after last session progressing 3/31/22     2.  Pt will be able to complete SL balance on foam EC for 10 seconds to demonstrate improved vestibular function for balance. Eval:instability with NBOS however no LOB     3.  Pt FOTO score will increase by >/= 8 points to show improvement in subjective function.   Eval:47  Current: 3/29/22 will address at visit 5  *FOTO score is an established functional score where 100 = no disability*    PLAN  []  Upgrade activities as tolerated     [x]  Continue plan of care  []  Update interventions per flow sheet       []  Discharge due to:_  []  Other:_      Eulalio Garcia 3/31/2022  3:53 PM    Future Appointments   Date Time Provider Claudine Farzaneh   4/6/2022  2:15 PM Edwin Brambila, PT [facility]   4/12/2022 10:45 AM RembijancJazzy [facility]   4/15/2022 10:45 AM Edwin Brambila, PT [facility]   4/19/2022 10:45 AM Jazzy Freeman [facility]   4/22/2022 12:15 PM RembijancJazzy [facility]   4/26/2022  1:30 PM RembijancJazzy [facility]   4/29/2022 12:15 PM RemJazzy lamas [facility]

## 2022-04-06 ENCOUNTER — HOSPITAL ENCOUNTER (OUTPATIENT)
Dept: PHYSICAL THERAPY | Age: 32
Discharge: HOME | End: 2022-04-06
Payer: COMMERCIAL

## 2022-04-06 PROCEDURE — 97530 THERAPEUTIC ACTIVITIES: CPT

## 2022-04-06 PROCEDURE — 97112 NEUROMUSCULAR REEDUCATION: CPT

## 2022-04-06 NOTE — PROGRESS NOTES
PT DAILY TREATMENT NOTE    Patient Name: Daniel Carrasquillo  Date:2022  : 1990  [x]  Patient  Verified  Payor: Xiang  / Plan: Jess Howard [id number] PPO / Product Type: PPO /    In time:2:16  Out time: 3:06  Total Treatment Time (min): 50  Total Timed Codes (min): 50  1:1 Treatment Time (MC/BCBS only): 50   Visit #: 4 of 12    Treatment Dx: Vertigo [R42]    SUBJECTIVE  Pain Level (0-10 scale): NA  Any medication changes, allergies to medications, adverse drug reactions, diagnosis change, or new procedure performed?: [x] No    [] Yes (see summary sheet for update)  Subjective functional status/changes:   [] No changes reported  \"Painting took forever and did not like it. I haven't left the house much since last time I saw you. \"    OBJECTIVE      15 min Therapeutic Activity:  [x]  See flow sheet :   Rationale: improve coordination, improve balance and increase proprioception  to improve the patients ability to perform daily activities with decreased pain and symptom levels     35 min Neuromuscular Re-education:  [x]  See flow sheet :   Rationale: increase strength, improve coordination, improve balance and increase proprioception  to improve the patients ability to perform daily activities with decreased pain and symptom levels          With   [] TE   [] TA   [] neuro   [] other: Patient Education: [x] Review HEP    [] Progressed/Changed HEP based on:   [] positioning   [] body mechanics   [] transfers   [] heat/ice application    [] other:      Other Objective/Functional Measures:   Able to complete 30 secVOR x1 standing without increasd dizziness   no saccades with remember targets  No LOB with standing VOR activities  5/10 max dizziness during session  < 30 seconds recovery   1-2 errors with tandem floor EC       Pain Level (0-10 scale) post treatment: NA    ASSESSMENT/Changes in Function: Pt with less recovery time today with < 30 seconds with interventions and improved endurance with ability to complete full 30 seconds today. Max dizziness 5/10 today with most challenged with vertical > horizontal head turns. Patient will continue to benefit from skilled PT services to modify and progress therapeutic interventions, address functional mobility deficits, analyze and cue movement patterns, analyze and modify body mechanics/ergonomics, assess and modify postural abnormalities, address imbalance/dizziness and instruct in home and community integration to attain remaining goals. [x]  See Plan of Care  []  See progress note/recertification  []  See Discharge Summary         Progress towards goals / Updated goals:  Short Term Goals: STG- To be accomplished in 2 week(s):  1.  Pt will be independent with HEP to encourage prophylaxis. Eval: HEP dispensed   Current: compliance 2-3x day progressing 3/31/21     Long Term Goals: LTG- To be accomplished in 6 week(s):  1.  Pt will report >/=80% improvement in symptoms to be able to complete ADLs with increased ease. Eval: episodes 2x day with spontaneous onset, losing balance   Current: episodes 1-2x day still, not lasting as long progressing 4/6/22      2.  Pt will be able to complete SL balance on foam EC for 10 seconds to demonstrate improved vestibular function for balance. Eval:instability with NBOS however no LOB  Current: tandem EC 1-2 erros progressing 4/6/22     3.  Pt FOTO score will increase by >/= 8 points to show improvement in subjective function.   Eval:47  Current: 3/29/22 will address at visit 5  *FOTO score is an established functional score where 100 = no disability*    PLAN  []  Upgrade activities as tolerated     [x]  Continue plan of care  []  Update interventions per flow sheet       []  Discharge due to:_  []  Other:_      Elenore Rule 4/6/2022  2:16 PM    Future Appointments   Date Time Provider Claudine Moy   4/12/2022 10:45 AM Carolynn Freeman [facility]   4/15/2022 10:45 AM Gearline Covert, PT [facility]   4/19/2022 10:45 AM Carolynn Freeman [facility]   4/22/2022 12:15 PM Carolynn Freeman [facility]   4/26/2022  1:30 PM Carolynn Freeman [facility]   4/29/2022 12:15 PM Carolynn Freeman [facility]

## 2022-04-12 ENCOUNTER — TELEPHONE (OUTPATIENT)
Dept: PHYSICAL THERAPY | Age: 32
End: 2022-04-12

## 2022-04-12 ENCOUNTER — APPOINTMENT (OUTPATIENT)
Dept: PHYSICAL THERAPY | Age: 32
End: 2022-04-12
Payer: COMMERCIAL

## 2022-04-15 ENCOUNTER — APPOINTMENT (OUTPATIENT)
Dept: PHYSICAL THERAPY | Age: 32
End: 2022-04-15
Payer: COMMERCIAL

## 2022-04-19 ENCOUNTER — HOSPITAL ENCOUNTER (OUTPATIENT)
Dept: PHYSICAL THERAPY | Age: 32
Discharge: HOME | End: 2022-04-19
Payer: COMMERCIAL

## 2022-04-19 PROCEDURE — 97530 THERAPEUTIC ACTIVITIES: CPT

## 2022-04-19 PROCEDURE — 97112 NEUROMUSCULAR REEDUCATION: CPT

## 2022-04-19 NOTE — PROGRESS NOTES
[facility] [address], [place], [address] [facility]  Phone: [phone]      Fax:  [phone]    Progress Note  Patient name: Marcia Sevilla Start of Care: 3/22/22   Referral source: Vika Love MD : 1990   Medical/Treatment Diagnosis: Vertigo [R42] Onset Date:2021     Prior Hospitalization: see medical history Provider#: [id number]   Medications: Verified on Patient Summary List    Comorbidities:social drinking, depression, smoke clove cigareets 1-2x week having one, hysterectomy 2021   Prior Level of Function: , able to leave home without fear of being alone due to balance     Visits from Start of Care: 5    Missed Visits: 2    Progress Towards Goals:   Short Term Goals: STG- To be accomplished in 2 week(s):  1.  Pt will be independent with HEP to encourage prophylaxis. Eval: HEP dispensed   Current: compliance per pt report goal MET 22     Long Term Goals: LTG- To be accomplished in 6 week(s):  1.  Pt will report >/=80% improvement in symptoms to be able to complete ADLs with increased ease. Eval: episodes 2x day with spontaneous onset, losing balance   Current: pt reporting falling with camping losing balance however reporting episodes are less during the week, 50% improvement progressing 22      2.  Pt will be able to complete SL balance on foam EC for 10 seconds to demonstrate improved vestibular function for balance. Eval:instability with NBOS however no LOB  Current: tandem EC on foam multiple errors progressing 22     3.  Pt FOTO score will increase by >/= 8 points to show improvement in subjective function. Eval:47  Current: 44 regression 22  *FOTO score is an established functional score where 100 = no disability*    Key Functional Changes: Pt presented to therapy with c/c dizziness ongoing since 2021 with insidious onset.  Pt has attended 5 sessions including eval with reporting 50% improvement in symptoms since starting therapy. Pt continues to be challenged with looking up and fearful of leaving the house alone due to imbalance and dizziness. Pt has improved tolerance to vestib rehab exercises with improved endurance to 45 seconds and quicker recovery time with most challenged with vertical head turns. Dizziness seems to have visual component with increased challenged with more visual stimulation possibly contributing to difficulty with navigating crowded spaces. Pt would benefit from continued skilled PT services to address remaining unmet goals to allow pt to leave house independently without fear of falling or increased dizziness to return to [facility].      Updated Goals: to be achieved in 4 weeks:   See goals above    ASSESSMENT/RECOMMENDATIONS:  [x]Continue therapy per initial plan/protocol at a frequency of  2 x per week for 4 weeks  []Continue therapy with the following recommended changes:_____________________      _____________________________________________________________________  []Discontinue therapy progressing towards or have reached established goals  []Discontinue therapy due to lack of appreciable progress towards goals  []Discontinue therapy due to lack of attendance or compliance  []Await Physician's recommendations/decisions regarding therapy  []Other:________________________________________________________________    Thank you for this referral.   Melquiades Hill Remesic 4/19/2022 1:09 PM

## 2022-04-19 NOTE — PROGRESS NOTES
PT DAILY TREATMENT NOTE    Patient Name: Matthew Card  Date:2022  : 1990  [x]  Patient  Verified  Payor: BLUE CROSS / Plan: Camron[place]onieta Howard [id number] PPO / Product Type: PPO /    In time:10:53  Out time:11: 40  Total Treatment Time (min): 47  Total Timed Codes (min): 47  1:1 Treatment Time (MC/BCBS only): 47   Visit #: 5 of 12    Treatment Dx: Vertigo [R42]    SUBJECTIVE  Pain Level (0-10 scale): NA  Any medication changes, allergies to medications, adverse drug reactions, diagnosis change, or new procedure performed?: [x] No    [] Yes (see summary sheet for update)  Subjective functional status/changes:   [] No changes reported  \"Sorry I missed last week I had a HA then my dog passed. I did fall when I went camping because I lost my balance when carrying things to the campsite from the car. I am okay though. I do feel like the episodes are getting better and less often but still don't trust myself leaving my house and running errands on my own and it;s still hard looking up. \"    OBJECTIVE    10 min Therapeutic Activity:  [x]  See flow sheet :   Rationale: improve coordination, improve balance and increase proprioception  to improve the patients ability to perform daily activities with decreased pain and symptom levels     30 min Neuromuscular Re-education:  [x]  See flow sheet :   Rationale: improve coordination, improve balance and increase proprioception  to improve the patients ability to perform daily activities with decreased pain and symptom levels      With   [] TE   [] TA   [] neuro   [] other: Patient Education: [x] Review HEP    [] Progressed/Changed HEP based on:   [] positioning   [] body mechanics   [] transfers   [] heat/ice application    [] other:      Other Objective/Functional Measures: see goals below  Visual stimulation card increased dizziness however quick recovery time < 20 seconds  More instability noted with stance on left LE in tandem     Pain Level (0-10 scale) post treatment: NA    ASSESSMENT/Changes in Function: Pt presented to therapy with c/c dizziness ongoing since December 2021 with insidious onset. Pt has attended 5 sessions including eval with reporting 50% improvement in symptoms since starting therapy. Pt continues to be challenged with looking up and fearful of leaving the house alone due to imbalance and dizziness. Pt has improved tolerance to vestib rehab exercises with improved endurance to 45 seconds and quicker recovery time with most challenged with vertical head turns. Dizziness seems to have visual component with increased challenged with more visual stimulation possibly contributing to difficulty with navigating crowded spaces. Pt would benefit from continued skilled PT services to address remaining unmet goals to allow pt to leave house independently without fear of falling or increased dizziness to return to OF. Patient will continue to benefit from skilled PT services to modify and progress therapeutic interventions, address functional mobility deficits, analyze and cue movement patterns, analyze and modify body mechanics/ergonomics, assess and modify postural abnormalities, address imbalance/dizziness and instruct in home and community integration to attain remaining goals. [x]  See Plan of Care  []  See progress note/recertification  []  See Discharge Summary         Progress towards goals / Updated goals:  Short Term Goals: STG- To be accomplished in 2 week(s):  1.  Pt will be independent with HEP to encourage prophylaxis. Eval: HEP dispensed   Current: compliance per pt report goal MET 4/19/22     Long Term Goals: LTG- To be accomplished in 6 week(s):  1.  Pt will report >/=80% improvement in symptoms to be able to complete ADLs with increased ease.    Eval: episodes 2x day with spontaneous onset, losing balance   Current: pt reporting falling with camping losing balance however reporting episodes are less during the week, 50% improvement progressing 4/19/22      2.  Pt will be able to complete SL balance on foam EC for 10 seconds to demonstrate improved vestibular function for balance. Eval:instability with NBOS however no LOB  Current: tandem EC on foam multiple errors progressing 4/19/22     3.  Pt FOTO score will increase by >/= 8 points to show improvement in subjective function.   Eval:47  Current: 44 regression 4/19/22  *FOTO score is an established functional score where 100 = no disability*    PLAN  []  Upgrade activities as tolerated     [x]  Continue plan of care  []  Update interventions per flow sheet       []  Discharge due to:_  []  Other:_      Jessica Bagley 4/19/2022  10:55 AM    Future Appointments   Date Time Provider Claudine Myo   4/22/2022 12:15 PM Arcelia Freeman [facility]   4/26/2022  1:30 PM Arcelia Freeman [facility]   4/29/2022 12:45 PM JENY Green [facility]

## 2022-04-22 ENCOUNTER — HOSPITAL ENCOUNTER (OUTPATIENT)
Dept: PHYSICAL THERAPY | Age: 32
Discharge: HOME | End: 2022-04-22
Payer: COMMERCIAL

## 2022-04-22 PROCEDURE — 97530 THERAPEUTIC ACTIVITIES: CPT

## 2022-04-22 PROCEDURE — 97112 NEUROMUSCULAR REEDUCATION: CPT

## 2022-04-22 NOTE — PROGRESS NOTES
PT DAILY TREATMENT NOTE    Patient Name: Wileen Osler  Date:2022  : 1990  [x]  Patient  Verified  Payor: BLUE TRACY / Plan: Jess Howard [id number] PPO / Product Type: PPO /    In time:12:18  Out time:1:03  Total Treatment Time (min): 45  Total Timed Codes (min): 45  1:1 Treatment Time (MC/BCBS only): 45   Visit #: 6 of 13    Treatment Dx: Vertigo [R42]    SUBJECTIVE  Pain Level (0-10 scale): NA  Any medication changes, allergies to medications, adverse drug reactions, diagnosis change, or new procedure performed?: [x] No    [] Yes (see summary sheet for update)  Subjective functional status/changes:   [] No changes reported  \"Felt tired after last time, worn out. Sitting on couch this morning and had episode of falling backwards lasting about 1-2min then went away after shutting eyes. \"    OBJECTIVE    15 min Therapeutic Activity:  [x]  See flow sheet :   Rationale: increase strength, improve coordination, improve balance and increase proprioception  to improve the patients ability to perform daily activities with decreased pain and symptom levels     30 min Neuromuscular Re-education:  [x]  See flow sheet :   Rationale: improve coordination, improve balance and increase proprioception  to improve the patients ability to perform daily activities with decreased pain and symptom levels    With   [] TE   [] TA   [] neuro   [] other: Patient Education: [x] Review HEP    [] Progressed/Changed HEP based on:   [] positioning   [] body mechanics   [] transfers   [] heat/ice application    [] other:      Other Objective/Functional Measures:   No LOB with foam VOR  Very quick recovery with exercises  Slower speed with head turns with dual task and visual cards      Pain Level (0-10 scale) post treatment: NA    ASSESSMENT/Changes in Function: Good tolerance to more progressions with vestibular exercises and balance today.  Pt continues to report more symptoms with vertical versus horizontal head turns however quicker recovery time. Challenged with dual task activities with slower speed noted. Patient will continue to benefit from skilled PT services to modify and progress therapeutic interventions, address functional mobility deficits, analyze and cue movement patterns, analyze and modify body mechanics/ergonomics, assess and modify postural abnormalities, address imbalance/dizziness and instruct in home and community integration to attain remaining goals. [x]  See Plan of Care  []  See progress note/recertification  []  See Discharge Summary         Progress towards goals / Updated goals:  Long Term Goals: LTG- To be accomplished in 6 week(s):  1.  Pt will report >/=80% improvement in symptoms to be able to complete ADLs with increased ease. Eval: episodes 2x day with spontaneous onset, losing balance   Last PN: pt reporting falling with camping losing balance however reporting episodes are less during the week, 50% improvement progressing 4/19/22   Current: pt reporting episode this morning lasting < 2min progressing 4/22/22     2.  Pt will be able to complete SL balance on foam EC for 10 seconds to demonstrate improved vestibular function for balance. Eval:instability with NBOS however no LOB  Last PN: tandem EC on foam multiple errors progressing 4/19/22  Current:      3.  Pt FOTO score will increase by >/= 8 points to show improvement in subjective function.   Eval:47  Last PN 44 regression 4/19/22  Current: will reassess visit 10   *FOTO score is an established functional score where 100 = no disability*       PLAN  []  Upgrade activities as tolerated     [x]  Continue plan of care  []  Update interventions per flow sheet       []  Discharge due to:_  []  Other:_      Viola Springer 4/22/2022  9:42 AM    Future Appointments   Date Time Provider Claudine Moy   4/22/2022 12:15 PM Caitlyn Freeman [facility]   4/26/2022  1:30 PM Caitlyn Freeman Close EDITA [facility]   4/29/2022 12:45 PM Fredy Ellington Venus Harris [facility]   5/3/2022  1:30 PM Remesic, Melquiades Hill [facility]   5/5/2022  1:30 PM Rene Maynard PT [facility]   5/11/2022 11:30 AM Remesic, Melquiades Hill [facility]   5/17/2022  1:30 PM Remesic, Melquiades Hill [facility]   5/24/2022  1:30 PM Remesic, Melquiades Hill [facility]   5/27/2022 12:15 PM Remesic, Melquiades Hill [facility]   5/31/2022  1:30 PM Remesic, Melquiades Hill [facility]   6/3/2022  1:00 PM Remesic, Melquiades Hill [facility]

## 2022-04-26 ENCOUNTER — HOSPITAL ENCOUNTER (OUTPATIENT)
Dept: PHYSICAL THERAPY | Age: 32
Discharge: HOME | End: 2022-04-26
Payer: COMMERCIAL

## 2022-04-26 PROCEDURE — 97112 NEUROMUSCULAR REEDUCATION: CPT

## 2022-04-26 PROCEDURE — 97530 THERAPEUTIC ACTIVITIES: CPT

## 2022-04-26 NOTE — PROGRESS NOTES
PT DAILY TREATMENT NOTE    Patient Name: Daysi Melo  Date:2022  : 1990  [x]  Patient  Verified  Payor: BLUE TRACY / Plan: Jess Howard [id number] PPO / Product Type: PPO /    In time:1:32  Out time:2:18  Total Treatment Time (min): 46  Total Timed Codes (min): 46  1:1 Treatment Time (MC/BCBS only): 46   Visit #: 7 of 12    Treatment Dx: Vertigo [R42]    SUBJECTIVE  Pain Level (0-10 scale): NA  Any medication changes, allergies to medications, adverse drug reactions, diagnosis change, or new procedure performed?: [x] No    [] Yes (see summary sheet for update)  Subjective functional status/changes:   [] No changes reported  \"I had an episode yesterday when I was doing online shopping where I thpught I was going to fall backward just sitting. \"    OBJECTIVE    16 min Therapeutic Activity:  [x]  See flow sheet :   Rationale: improve coordination, improve balance and increase proprioception  to improve the patients ability to  perform daily activities with decreased pain and symptom levels     30 min Neuromuscular Re-education:  [x]  See flow sheet :   Rationale: improve coordination, improve balance and increase proprioception  to improve the patients ability to perform daily activities with decreased pain and symptom levels    With   [] TE   [] TA   [] neuro   [] other: Patient Education: [x] Review HEP    [] Progressed/Changed HEP based on:   [] positioning   [] body mechanics   [] transfers   [] heat/ice application    [] other:      Other Objective/Functional Measures:   No LOB with walking outside on grass with head turns - slight instability initially backwards  Challenged with heel to instep with VOR x1 pattern  Increased dizziness with pattern     Pain Level (0-10 scale) post treatment: NA    ASSESSMENT/Changes in Function: good tolerance to interventions today with qucik recovery and no LOB with activities outside on grass today.  Pt reporting overall episodes less frequent however sitting c/o random epsidoes such as this weekend with looking online while shopping for things. Suspect continued visual component contributing to continued dizziness. Patient will continue to benefit from skilled PT services to modify and progress therapeutic interventions, address functional mobility deficits, analyze and cue movement patterns, analyze and modify body mechanics/ergonomics, assess and modify postural abnormalities, address imbalance/dizziness and instruct in home and community integration to attain remaining goals. [x]  See Plan of Care  []  See progress note/recertification  []  See Discharge Summary         Progress towards goals / Updated goals:  Long Term Goals: LTG- To be accomplished in 6 week(s):  1.  Pt will report >/=80% improvement in symptoms to be able to complete ADLs with increased ease. Eval: episodes 2x day with spontaneous onset, losing balance   Last PN: pt reporting falling with camping losing balance however reporting episodes are less during the week, 50% improvement progressing 4/19/22   Current: pt reporting episodes less frequent and shorter however still random epsidoes when sitting on couch progressing 4/26.22     2.  Pt will be able to complete SL balance on foam EC for 10 seconds to demonstrate improved vestibular function for balance. Eval:instability with NBOS however no LOB  Last PN: tandem EC on foam multiple errors progressing 4/19/22  Current:      3.  Pt FOTO score will increase by >/= 8 points to show improvement in subjective function.   Eval:47  Last PN 44 regression 4/19/22  Current: will reassess visit 10   *FOTO score is an established functional score where 100 = no disability*    PLAN  []  Upgrade activities as tolerated     [x]  Continue plan of care  []  Update interventions per flow sheet       []  Discharge due to:_  []  Other:_      Perez Appl 4/26/2022  1:38 PM    Future Appointments   Date Time Provider Claudine Moy   4/29/2022 12:45 PM Gabriella Finger, PT [facility]   5/3/2022  1:30 PM Remesic, Love Stair [facility]   5/5/2022  1:30 PM Gabriella Finger, PT [facility]   5/11/2022 11:30 AM Remesic, Love Stair [facility]   5/17/2022  1:30 PM Remesic, Love Stair [facility]   5/24/2022  1:30 PM Remesic, Love Stair [facility]   5/27/2022 12:15 PM Remesic, Love Stair [facility]   5/31/2022  1:30 PM Remesic, Love Stair [facility]   6/3/2022  1:00 PM Remesic, Love Stair [facility]

## 2022-04-29 ENCOUNTER — HOSPITAL ENCOUNTER (OUTPATIENT)
Dept: PHYSICAL THERAPY | Age: 32
Discharge: HOME | End: 2022-04-29
Payer: COMMERCIAL

## 2022-04-29 PROCEDURE — 97530 THERAPEUTIC ACTIVITIES: CPT

## 2022-04-29 PROCEDURE — 97112 NEUROMUSCULAR REEDUCATION: CPT

## 2022-04-29 NOTE — PROGRESS NOTES
PT DAILY TREATMENT NOTE    Patient Name: Vasquez Cadet  Date:2022  : 1990  [x]  Patient  Verified  Payor: BLUE TRACY / Plan: Jess Howard [id number] PPO / Product Type: PPO /    In time:12:49  Out time:1:35  Total Treatment Time (min): 46  Total Timed Codes (min): 46  1:1 Treatment Time (MC/BCBS only): 46   Visit #: 8 of 12    Treatment Dx: Vertigo [R42]    SUBJECTIVE  Pain Level (0-10 scale): dizziness 0/10  Any medication changes, allergies to medications, adverse drug reactions, diagnosis change, or new procedure performed?: [x] No    [] Yes (see summary sheet for update)  Subjective functional status/changes:   [] No changes reported  Pt reports that later in the evening she felt pretty rough and almost all day wed she was really dizzy and most of yesterday as well. Reports that she has had a rough 2 days. Reports that she doesn't feel her best today but not as bad. Reports that she didn't do any of the HEP but did do the techniques to help her feel better. Denies getting vertigo. Reports that she does feel planted, or sturdy on her feet. Reports that she is getting better. Allergies are bothering her today.     OBJECTIVE         15 min Therapeutic Activity:  []  See flow sheet :   Rationale: increase ROM, increase strength, improve coordination, improve balance and increase proprioception  to improve the patients ability to perform daily activities with decreased pain and symptom levels       28 min Neuromuscular Re-education:  []  See flow sheet :   Rationale: increase ROM, increase strength, improve coordination, improve balance and increase proprioception  to improve the patients ability to perform daily activities with decreased pain and symptom levels          3  NC min Manual Therapy:  Rib mobs with active breath, infraclavicular rib pumping with active breath, sternal mobs with active breath; constant for hand placement   The manual therapy interventions were performed at a separate and distinct time from the therapeutic activities interventions. Rationale: increase ROM and increase postural awareness to perform daily activities with decreased pain and symptom levels      With   [] TE   [x] TA   [x] neuro   [] other: Patient Education: [x] Review HEP    [] Progressed/Changed HEP based on:   [] positioning   [] body mechanics   [] transfers   [] heat/ice application    [] other:      Other Objective/Functional Measures: Pt enters gym in apparent distress. Recovery time: VORx1 horizontal: 1st time: tiny bit of dizziness 2nd time: 13\" (dizziness that makes me feel off balance) Vertical: 1st time: no dizziness 2nd time:  No dizziness  2 targets: 1st time: no dizziness 2nd time: no dizziness  VORx2 1st time: \"not fun\" 2nd time:     Pain Level (0-10 scale) post treatment: n/a    ASSESSMENT/Changes in Function: Patient tolerated therapy session well as there were no adverse reactions today. Pt with inc symptoms after last therapy session so avoided patterns this therapy session. Recommended pt to continue to perform habituation exercises even when feeling bad, but maybe dec linda or sets in the day. Pt did require intermittent UE support with performing foam eyes closed. Performed rib mobs to assist with posture to help with head positioning to assist with dec dizziness feeling. Pt is progressing with therapy as indicated by pt tolerating increase in exercise repetitions and resistance. Although showing progress patient would benefit from continuation of skilled physical therapy to address the remaining limitations.        Patient will continue to benefit from skilled PT services to modify and progress therapeutic interventions, address functional mobility deficits, address ROM deficits, address strength deficits, analyze and address soft tissue restrictions, analyze and cue movement patterns, analyze and modify body mechanics/ergonomics, assess and modify postural abnormalities, address imbalance/dizziness and instruct in home and community integration to attain remaining goals. [x]  See Plan of Care  []  See progress note/recertification  []  See Discharge Summary         Progress towards goals / Updated goals:  Long Term Goals: LTG- To be accomplished in 6 week(s):  1.  Pt will report >/=80% improvement in symptoms to be able to complete ADLs with increased ease. Eval: episodes 2x day with spontaneous onset, losing balance   Last PN: pt reporting falling with camping losing balance however reporting episodes are less during the week, 50% improvement progressing 4/19/22   Current: 4/29/22 pt feeling bad today and after last therapy session     2.  Pt will be able to complete SL balance on foam EC for 10 seconds to demonstrate improved vestibular function for balance. Eval:instability with NBOS however no LOB  Last PN: tandem EC on foam multiple errors progressing 4/19/22  Current: 4/29/22 performed EC on foam and pt with intermittent UE support     3.  Pt FOTO score will increase by >/= 8 points to show improvement in subjective function.   Eval:47  Last PN 44 regression 4/19/22  Current: will reassess visit 10   *FOTO score is an established functional score where 100 = no disability*    PLAN  []  Upgrade activities as tolerated     [x]  Continue plan of care  []  Update interventions per flow sheet       []  Discharge due to:_  []  Other:_      Dinah Estrada, PT, DPT, CIMT 4/29/2022  12:50 PM    Future Appointments   Date Time Provider Claudine Moy   5/3/2022  1:30 PM [facility]   5/5/2022 11:30 AM RemArcelia lamas [facility]   5/11/2022 11:30 AM RembijancArcelia [facility]   5/17/2022 12:45 PM Alix Membreno, PT [facility]   5/24/2022  1:30 PM RemArcelia lamas [facility]   5/27/2022 12:15 PM RembijancArcelia [facility]   5/31/2022  1:30 PM [facility]ALIDA [facility]   6/3/2022  1:00 PM Arcelia Freeman [facility]

## 2022-05-05 ENCOUNTER — HOSPITAL ENCOUNTER (OUTPATIENT)
Dept: PHYSICAL THERAPY | Age: 32
Discharge: HOME | End: 2022-05-05
Payer: COMMERCIAL

## 2022-05-05 PROCEDURE — 97530 THERAPEUTIC ACTIVITIES: CPT

## 2022-05-05 PROCEDURE — 97112 NEUROMUSCULAR REEDUCATION: CPT

## 2022-05-05 NOTE — PROGRESS NOTES
PT DAILY TREATMENT NOTE    Patient Name: Leonel Rosado  Date:2022  : 1990  [x]  Patient  Verified  Payor: Chelsey Trinidad / Plan: Jess Howard [id number] PPO / Product Type: PPO /    In time:11:35  Out time:12:20  Total Treatment Time (min): 45  Total Timed Codes (min): 45  1:1 Treatment Time (MC/BCBS only): 40   Visit #: 9 of 12    Treatment Dx: Vertigo [R42]    SUBJECTIVE  Pain Level (0-10 scale): Na  Any medication changes, allergies to medications, adverse drug reactions, diagnosis change, or new procedure performed?: [x] No    [] Yes (see summary sheet for update)  Subjective functional status/changes:   [] No changes reported  \"the last few days have been good actually. 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 Whatever she did to my ribs las time was great. \"    OBJECTIVE    20 min Therapeutic Activity:  [x]  See flow sheet :   Rationale: improve coordination, improve balance and increase proprioception  to improve the patients ability to perform daily activities with decreased pain and symptom levels     25 min Neuromuscular Re-education:  [x]  See flow sheet :   Rationale: improve coordination, improve balance and increase proprioception  to improve the patients ability to perform daily activities with decreased pain and symptom levels    With   [] TE   [] TA   [] neuro   [] other: Patient Education: [x] Review HEP    [] Progressed/Changed HEP based on:   [] positioning   [] body mechanics   [] transfers   [] heat/ice application    [] other:      Other Objective/Functional Measures:   Increased c/o unsteadiness with walking in hallway  Left glut fatigue with left stance with VOR  Foam SL EC 3 sec bilaterally       Pain Level (0-10 scale) post treatment: Na    ASSESSMENT/Changes in Function: Good tolerance to interventions with reporting no increase in symptoms post session only fatigue.  Pt continues to be most challenged with visual stimulation activities and increased difficulty noted with balance with VOR in left stance. Only unsteadiness reported with walking in hallway with head turns however no significant veering noted. Patient will continue to benefit from skilled PT services to modify and progress therapeutic interventions, address functional mobility deficits, analyze and cue movement patterns, analyze and modify body mechanics/ergonomics, assess and modify postural abnormalities, address imbalance/dizziness and instruct in home and community integration to attain remaining goals. [x]  See Plan of Care  []  See progress note/recertification  []  See Discharge Summary         Progress towards goals / Updated goals:  Long Term Goals: LTG- To be accomplished in 6 week(s):  1.  Pt will report >/=80% improvement in symptoms to be able to complete ADLs with increased ease. Eval: episodes 2x day with spontaneous onset, losing balance   Last PN: pt reporting falling with camping losing balance however reporting episodes are less during the week, 50% improvement progressing 4/19/22   Current: 4/29/22 pt feeling bad today and after last therapy session     2.  Pt will be able to complete SL balance on foam EC for 10 seconds to demonstrate improved vestibular function for balance. Eval:instability with NBOS however no LOB  Last PN: tandem EC on foam multiple errors progressing 4/19/22  Current: 3 sec bilaterally progressing 5/5/22     3.  Pt FOTO score will increase by >/= 8 points to show improvement in subjective function.   Eval:47  Last PN 44 regression 4/19/22  Current: will reassess visit 10   *FOTO score is an established functional score where 100 = no disability*       PLAN  []  Upgrade activities as tolerated     [x]  Continue plan of care  []  Update interventions per flow sheet       []  Discharge due to:_  []  Other:_      Viola Springer 5/5/2022  9:54 AM    Future Appointments   Date Time Provider Claudine Moy 5/5/2022 11:30 AM RembijancJuanito [facility]   5/11/2022 11:30 AM Rembijanc, Juanito Worthy [facility]   5/17/2022 12:45 PM Yan Sandoval PT [facility]   5/24/2022  1:30 PM RembijancJuanito [facility]   5/27/2022 12:15 PM RembijancJuanito [facility]   5/31/2022  1:30 PM RembijancJuanito [facility]   6/3/2022  1:00 PM RemesicJuanito [facility]

## 2022-05-11 ENCOUNTER — HOSPITAL ENCOUNTER (OUTPATIENT)
Dept: PHYSICAL THERAPY | Age: 32
Discharge: HOME | End: 2022-05-11
Payer: COMMERCIAL

## 2022-05-11 PROCEDURE — 97530 THERAPEUTIC ACTIVITIES: CPT

## 2022-05-11 PROCEDURE — 97112 NEUROMUSCULAR REEDUCATION: CPT

## 2022-05-11 NOTE — PROGRESS NOTES
PT DAILY TREATMENT NOTE    Patient Name: Sera Patton  Date:2022  : 1990  [x]  Patient  Verified  Payor: BLUE TRACY / Plan: Jess Howard [id number] PPO / Product Type: PPO /    In time:11:30  Out time:12:15  Total Treatment Time (min): 45  Total Timed Codes (min): 45  1:1 Treatment Time (MC/BCBS only): 45   Visit #: 10 of 12    Treatment Dx: Vertigo [R42]    SUBJECTIVE  Pain Level (0-10 scale): NA  Any medication changes, allergies to medications, adverse drug reactions, diagnosis change, or new procedure performed?: [x] No    [] Yes (see summary sheet for update)  Subjective functional status/changes:   [] No changes reported  \"Pt reporting falling when turned around then going up the stairs and then fell forward. Pt reporting scrape on foot but otherwise okay. , Pt reporting some dizziness on mothers day going to [employer] and driving home. \"    OBJECTIVE      10 min Therapeutic Activity:  [x]  See flow sheet :   Rationale: improve coordination, improve balance and increase proprioception  to improve the patients ability to perform daily activities with decreased pain and symptom levels      35 min Neuromuscular Re-education:  [x]  See flow sheet :   Rationale: increase strength, improve coordination, improve balance and increase proprioception  to improve the patients ability to perform daily activities with decreased pain and symptom levels    With   [] TE   [] TA   [] neuro   [] other: Patient Education: [x] Review HEP    [] Progressed/Changed HEP based on:   [] positioning   [] body mechanics   [] transfers   [] heat/ice application    [] other:      Other Objective/Functional Measures:   No veering with walking forward/backward in hallway  Able to complete turn then step up without LOB  Cues to keep trunk leaning forward with balance activities      Pain Level (0-10 scale) post treatment: NA    ASSESSMENT/Changes in Function: Good tolerance to interventions with pt reporting overall dizziness is lasting less when it comes and able to go to [employer] without issues. Pt did have a fall this weekend going up stairs however reporting no adverse effects with education to f/u with MD as needed. Pt reporting > 50% improvement in symptoms since starting therapy. Alejandrina Babin continues to be challenged per pt report however no veering noted today. Patient will continue to benefit from skilled PT services to modify and progress therapeutic interventions, address functional mobility deficits, analyze and cue movement patterns, analyze and modify body mechanics/ergonomics, assess and modify postural abnormalities, address imbalance/dizziness and instruct in home and community integration to attain remaining goals. [x]  See Plan of Care  []  See progress note/recertification  []  See Discharge Summary         Progress towards goals / Updated goals:  Long Term Goals: LTG- To be accomplished in 6 week(s):  1.  Pt will report >/=80% improvement in symptoms to be able to complete ADLs with increased ease. Eval: episodes 2x day with spontaneous onset, losing balance   Last PN: pt reporting falling with camping losing balance however reporting episodes are less during the week, 50% improvement progressing 4/19/22   Current: > 50% improvement, pt reporting ability to go to [facility] with less dizziness and attempting grocery store today progressing 5/11/22     2.  Pt will be able to complete SL balance on foam EC for 10 seconds to demonstrate improved vestibular function for balance. Eval:instability with NBOS however no LOB  Last PN: tandem EC on foam multiple errors progressing 4/19/22  Current: 3 sec bilaterally progressing 5/5/22     3.  Pt FOTO score will increase by >/= 8 points to show improvement in subjective function.   Eval:47  Last PN 44 regression 4/19/22  Current: will reassess visit 10   *FOTO score is an established functional score where 100 = no disability*    PLAN  []  Upgrade activities as tolerated     [x]  Continue plan of care  []  Update interventions per flow sheet       []  Discharge due to:_  []  Other:_      Joannavazquez Nimco 5/11/2022  11:31 AM    Future Appointments   Date Time Provider Claudine Moy   5/17/2022 12:45 PM Jake Baidllo PT [facility]   5/24/2022  1:30 PM Remesic, Alexandro Neri [facility]   5/27/2022 12:15 PM Remesic, Alexandro Neri [facility]   5/31/2022  1:30 PM Remesic, [facility] N[facility]   6/3/2022  1:00 PM Remesic, Alexandro Neri [facility]

## 2022-05-17 ENCOUNTER — APPOINTMENT (OUTPATIENT)
Dept: PHYSICAL THERAPY | Age: 32
End: 2022-05-17
Payer: COMMERCIAL

## 2022-05-24 ENCOUNTER — HOSPITAL ENCOUNTER (OUTPATIENT)
Dept: PHYSICAL THERAPY | Age: 32
Discharge: HOME | End: 2022-05-24
Payer: COMMERCIAL

## 2022-05-24 PROCEDURE — 97530 THERAPEUTIC ACTIVITIES: CPT

## 2022-05-24 PROCEDURE — 97112 NEUROMUSCULAR REEDUCATION: CPT

## 2022-05-24 NOTE — PROGRESS NOTES
PT DAILY TREATMENT NOTE    Patient Name: Daysi Melo  Date:2022  : 1990  [x]  Patient  Verified  Payor: Tamir Tong / Plan: Jess Howard [id number] PPO / Product Type: PPO /    In time:1:34  Out time:2:18  Total Treatment Time (min): 44  Total Timed Codes (min): 44  1:1 Treatment Time (MC/BCBS only): 44   Visit #: 11 of 12    Treatment Dx: Vertigo [R42]    SUBJECTIVE  Pain Level (0-10 scale): NA  Any medication changes, allergies to medications, adverse drug reactions, diagnosis change, or new procedure performed?: [x] No    [] Yes (see summary sheet for update)  Subjective functional status/changes:   [] No changes reported  \"went hiking and camping with some symptoms with the windy roads. episodes 3-4 x week versus everyday. Went to [employer] yesterday and went fine with going well. \"    OBJECTIVE  10 min Therapeutic Activity:  [x]  See flow sheet :   Rationale: increase strength, improve coordination and increase proprioception  to improve the patients ability to perform daily activities with decreased pain and symptom levels     34 min Neuromuscular Re-education:  [x]  See flow sheet :   Rationale: improve coordination, improve balance and increase proprioception  to improve the patients ability to perform daily activities with decreased pain and symptom levels    With   [] TE   [] TA   [] neuro   [] other: Patient Education: [x] Review HEP    [] Progressed/Changed HEP based on:   [] positioning   [] body mechanics   [] transfers   [] heat/ice application    [] other:      Other Objective/Functional Measures: see goals below  No significant veering with walking in hallway  No LOB with cone weaving forward and backward    Pain Level (0-10 scale) post treatment: NA    ASSESSMENT/Changes in Function: Pt presented to therapy with c/c  dizziness ongoing since 2021 with insidious onset. Pt has attended 11 sessions with reporting > 50 % improvement since starting therapy.  Pt reporting episodes only 3-4x week versus everyday and able to grocery shop again although needing to taking time without significant increase in symptoms. Pt most challenged still with EC on foam epsecially with head turns due to continued vestibular weakness. Plan to finish out last 3 scheduled PT visits with then DC with updated HEP to allow pt to return to social activities, walking and grocery shopping without fear of completing independently or significant increase in symptoms. Patient will continue to benefit from skilled PT services to modify and progress therapeutic interventions, address functional mobility deficits, address strength deficits, analyze and cue movement patterns, analyze and modify body mechanics/ergonomics, assess and modify postural abnormalities, address imbalance/dizziness and instruct in home and community integration to attain remaining goals. [x]  See Plan of Care  []  See progress note/recertification  []  See Discharge Summary         Progress towards goals / Updated goals:  Long Term Goals: LTG- To be accomplished in 6 week(s):  1.  Pt will report >/=80% improvement in symptoms to be able to complete ADLs with increased ease. Eval: episodes 2x day with spontaneous onset, losing balance   Last PN: pt reporting falling with camping losing balance however reporting episodes are less during the week, 50% improvement progressing 4/19/22   Current: > 50% improvement, episodes 3-4x week versus everyday progressing well 5/2422     2.  Pt will be able to complete SL balance on foam EC for 10 seconds to demonstrate improved vestibular function for balance. Eval:instability with NBOS however no LOB  Last PN: tandem EC on foam multiple errors progressing 4/19/22  Current: 4 seconds bilaterally progressing 5/24/22     3.  Pt FOTO score will increase by >/= 8 points to show improvement in subjective function.   Eval:47  Last PN 44 regression 4/19/22  Current: 54 almost MET 5/24/22   *FOTO score is an established functional score where 100 = no disability*       PLAN  []  Upgrade activities as tolerated     [x]  Continue plan of care  []  Update interventions per flow sheet       []  Discharge due to:_  []  Other:_      Sindy Guzman 5/24/2022  1:26 PM    Future Appointments   Date Time Provider Claudine Moy   5/24/2022  1:30 PM Remesic, Colo Pi [facility]   5/27/2022 12:15 PM Remesic, Colo Pi [facility]   5/31/2022  1:30 PM Remesic, Colo Pi [facility]   6/3/2022  1:00 PM Remesic, Colo Pi [facility]

## 2022-05-24 NOTE — PROGRESS NOTES
[facility] at the [address], [place] Claudine faulkner, [address] [facility]  Phone: [phone]      Fax:  [phone]    Progress Note  Patient name: Reid Daniels Start of Care: 3/22/22   Referral source: Sharon Lacy MD : 1990   Medical/Treatment Diagnosis: Vertigo [R42] Onset Date:2021     Prior Hospitalization: see medical history Provider#: [id number]   Medications: Verified on Patient Summary List    Comorbidities:social drinking, depression, smoke clove cigareets 1-2x week having one, hysterectomy 2021   Prior Level of Function: , able to leave home without fear of being alone due to balance   Visits from Start of Care: 11    Missed Visits: 2    Progress Towards Goals: Pt presented to therapy with c/c  dizziness ongoing since 2021 with insidious onset. Pt has attended 11 sessions with reporting > 50 % improvement since starting therapy. Pt reporting episodes only 3-4x week versus everyday and able to grocery shop again although needing to taking time without significant increase in symptoms. Pt most challenged still with EC on foam epsecially with head turns due to continued vestibular weakness. Plan to finish out last 3 scheduled PT visits with then DC with updated HEP to allow pt to return to social activities, walking and grocery shopping without fear of completing independently or significant increase in symptoms.         Key Functional Changes: Long Term Goals: LTG- To be accomplished in 6 week(s):  1.  Pt will report >/=80% improvement in symptoms to be able to complete ADLs with increased ease.    Eval: episodes 2x day with spontaneous onset, losing balance   Last PN: pt reporting falling with camping losing balance however reporting episodes are less during the week, 50% improvement progressing 22   Current: > 50% improvement, episodes 3-4x week versus everyday progressing well 2422     2.  Pt will be able to complete SL balance on foam EC for 10 seconds to demonstrate improved vestibular function for balance. Eval:instability with NBOS however no LOB  Last PN: tandem EC on foam multiple errors progressing 4/19/22  Current: 4 seconds bilaterally progressing 5/24/22     3.  Pt FOTO score will increase by >/= 8 points to show improvement in subjective function.   Eval:47  Last PN 44 regression 4/19/22  Current: 54 almost MET 5/24/22   *FOTO score is an established functional score where 100 = no disability*    Updated Goals: to be achieved in 3 treatments:   See goals above    ASSESSMENT/RECOMMENDATIONS:  [x]Continue therapy per initial plan/protocol at a frequency of  3 visits  []Continue therapy with the following recommended changes:_____________________      _____________________________________________________________________  []Discontinue therapy progressing towards or have reached established goals  []Discontinue therapy due to lack of appreciable progress towards goals  []Discontinue therapy due to lack of attendance or compliance  []Await Physician's recommendations/decisions regarding therapy  []Other:________________________________________________________________    Thank you for this referral.   Melquiades Hill Remesi 5/24/2022 1:27 PM

## 2022-05-27 ENCOUNTER — HOSPITAL ENCOUNTER (OUTPATIENT)
Dept: PHYSICAL THERAPY | Age: 32
Discharge: HOME | End: 2022-05-27
Payer: COMMERCIAL

## 2022-05-27 PROCEDURE — 97112 NEUROMUSCULAR REEDUCATION: CPT

## 2022-05-27 PROCEDURE — 97530 THERAPEUTIC ACTIVITIES: CPT

## 2022-05-27 NOTE — PROGRESS NOTES
PT DAILY TREATMENT NOTE    Patient Name: Daysi Melo  Date:2022  : 1990  [x]  Patient  Verified  Payor: BLUE TRACY / Plan: Jess Howard [id number] PPO / Product Type: PPO /    In time:12:18  Out time:1:00  Total Treatment Time (min): 42  Total Timed Codes (min): 42  1:1 Treatment Time (MC/BCBS only): 42   Visit #: 12 of 14    Treatment Dx: Vertigo [R42]    SUBJECTIVE  Pain Level (0-10 scale): NA  Any medication changes, allergies to medications, adverse drug reactions, diagnosis change, or new procedure performed?: [x] No    [] Yes (see summary sheet for update)  Subjective functional status/changes:   [] No changes reported  \"Good. Still hard to look at the computer sometimes and quick turns. \"    OBJECTIVE      17 min Therapeutic Activity:  [x]  See flow sheet :   Rationale: increase strength, improve coordination and increase proprioception  to improve the patients ability to perform daily activities with decreased pain and symptom levels     25 min Neuromuscular Re-education:  [x]  See flow sheet :   Rationale: improve coordination, improve balance and increase proprioception  to improve the patients ability to perform daily activities with decreased pain and symptom levels    With   [] TE   [] TA   [] neuro   [] other: Patient Education: [x] Review HEP    [] Progressed/Changed HEP based on:   [] positioning   [] body mechanics   [] transfers   [] heat/ice application    [] other:      Other Objective/Functional Measures:   No LOB heel to instep foam EC with head turns  No LOB with pattern VORX1 on foam   Good coordination with ladder drills  Increased symptoms with pattern card with quick switch and saccade circles  Slight veering to right with walking with EC      Pain Level (0-10 scale) post treatment: NA    ASSESSMENT/Changes in Function: Good tolerance to interventions with no adverse reactions with pt reporting fatigue post session.  Improved static balance noted today on foam even with EC. More progressive dynamic balance activities initiated today with good tolerance with no significant LOB and able to independently recover with stepping strategy. Pt reporting overall feeling better with most challenged with computer now with quick scrolling. Patient will continue to benefit from skilled PT services to modify and progress therapeutic interventions, address functional mobility deficits, address strength deficits, analyze and cue movement patterns, analyze and modify body mechanics/ergonomics, assess and modify postural abnormalities, address imbalance/dizziness and instruct in home and community integration to attain remaining goals. [x]  See Plan of Care  []  See progress note/recertification  []  See Discharge Summary         Progress towards goals / Updated goals:  Long Term Goals: LTG- To be accomplished in 6 week(s):  1.  Pt will report >/=80% improvement in symptoms to be able to complete ADLs with increased ease. Eval: episodes 2x day with spontaneous onset, losing balance   Last PN:: > 50% improvement, episodes 3-4x week versus everyday progressing well 5/2422  Current:      2.  Pt will be able to complete SL balance on foam EC for 10 seconds to demonstrate improved vestibular function for balance. Eval:instability with NBOS however no LOB  Last PN:: 4 seconds bilaterally progressing 5/24/22  Current: no LOB with heel to instep with EC on foam with head turns progressing 5/27/22     3.  Pt FOTO score will increase by >/= 8 points to show improvement in subjective function.   Eval:47  Last PN : 54 almost MET 5/24/22   Current: will reassess visit 14   *FOTO score is an established functional score where 100 = no disability*    PLAN  []  Upgrade activities as tolerated     [x]  Continue plan of care  []  Update interventions per flow sheet       []  Discharge due to:_  []  Other:_      Charla Hinds 5/27/2022  7:42 AM    Future Appointments   Date Time Provider Department [place]   5/27/2022 12:15 PM Jazzy Freeman [facility]   5/31/2022  1:30 PM Jazzy Freeman [facility]   6/3/2022  1:00 PM Jazzy Freeman [facility]

## 2022-05-31 ENCOUNTER — HOSPITAL ENCOUNTER (OUTPATIENT)
Dept: PHYSICAL THERAPY | Age: 32
Discharge: HOME | End: 2022-05-31
Payer: COMMERCIAL

## 2022-05-31 PROCEDURE — 97530 THERAPEUTIC ACTIVITIES: CPT

## 2022-05-31 PROCEDURE — 97112 NEUROMUSCULAR REEDUCATION: CPT

## 2022-05-31 NOTE — PROGRESS NOTES
PT DAILY TREATMENT NOTE    Patient Name: Vaughn Skiff  Date:2022  : 1990  [x]  Patient  Verified  Payor: Meg العراقي / Plan: Jess Howard [id number] PPO / Product Type: PPO /    In time:1:34  Out time:2:15  Total Treatment Time (min): 41  Total Timed Codes (min): 41  1:1 Treatment Time (MC/BCBS only): 41   Visit #: 13 of 14    Treatment Dx: Vertigo [R42]    SUBJECTIVE  Pain Level (0-10 scale): NA  Any medication changes, allergies to medications, adverse drug reactions, diagnosis change, or new procedure performed?: [x] No    [] Yes (see summary sheet for update)  Subjective functional status/changes:   [] No changes reported  \"Tired. Felt a little bit on Saturday but I think it was more the heat than anything. \"    OBJECTIVE    11 min Therapeutic Activity:  [x]  See flow sheet :   Rationale: increase strength, improve coordination and increase proprioception  to improve the patients ability to perform daily activities with decreased pain and symptom levels     30 min Neuromuscular Re-education:  [x]  See flow sheet :   Rationale: improve coordination, improve balance and increase proprioception  to improve the patients ability to perform daily activities with decreased pain and symptom levels    With   [] TE   [] TA   [] neuro   [] other: Patient Education: [x] Review HEP    [] Progressed/Changed HEP based on:   [] positioning   [] body mechanics   [] transfers   [] heat/ice application    [] other:      Other Objective/Functional Measures:   3-4 errors with tandem foam EC  No LOB with bosu head turns  1 LOB wedge NBOS EC       Pain Level (0-10 scale) post treatment: NA    ASSESSMENT/Changes in Function: Good tolerance to progressions today with most challenged with static EC on non compliance surface. Initially with walking with EC right veering however second and third trial able to walk without veering. Plan to finish out last scheduled appointment with then DC with updated HEP next visit. Patient will continue to benefit from skilled PT services to modify and progress therapeutic interventions, address functional mobility deficits, analyze and cue movement patterns, analyze and modify body mechanics/ergonomics, assess and modify postural abnormalities, address imbalance/dizziness and instruct in home and community integration to attain remaining goals. [x]  See Plan of Care  []  See progress note/recertification  []  See Discharge Summary         Progress towards goals / Updated goals:  Long Term Goals: LTG- To be accomplished in 6 week(s):  1.  Pt will report >/=80% improvement in symptoms to be able to complete ADLs with increased ease. Eval: episodes 2x day with spontaneous onset, losing balance   Last PN:: > 50% improvement, episodes 3-4x week versus everyday progressing well 5/2422  Current:      2.  Pt will be able to complete SL balance on foam EC for 10 seconds to demonstrate improved vestibular function for balance. Eval:instability with NBOS however no LOB  Last PN:: 4 seconds bilaterally progressing 5/24/22  Current: 3-4 errors in 30 seconds foam EC tandem progressing 5/31/22     3.  Pt FOTO score will increase by >/= 8 points to show improvement in subjective function.   Eval:47  Last PN : 54 almost MET 5/24/22   Current: will reassess visit 14   *FOTO score is an established functional score where 100 = no disability*    PLAN  []  Upgrade activities as tolerated     [x]  Continue plan of care  []  Update interventions per flow sheet       []  Discharge due to:_  []  Other:_      Humaira Marquez 5/31/2022  8:06 AM    Future Appointments   Date Time Provider Claudine Moy   5/31/2022  1:30 PM Rekha Freeman [facility]   6/3/2022  1:00 PM Rekha Freeman [facility]

## 2022-06-03 ENCOUNTER — HOSPITAL ENCOUNTER (OUTPATIENT)
Dept: PHYSICAL THERAPY | Age: 32
Discharge: HOME | End: 2022-06-03
Payer: COMMERCIAL

## 2022-06-03 PROCEDURE — 97112 NEUROMUSCULAR REEDUCATION: CPT

## 2022-06-03 PROCEDURE — 97530 THERAPEUTIC ACTIVITIES: CPT

## 2022-06-03 NOTE — PROGRESS NOTES
PT DAILY TREATMENT NOTE    Patient Name: Carlos Manuel Harrell  [id number]  : 1990  [x]  Patient  Verified  Payor: Israel Hinojosa / Plan: Jess Howard [id number] PPO / Product Type: PPO /    In time:1:23  Out time:2:03  Total Treatment Time (min): 40  Total Timed Codes (min): 40  1:1 Treatment Time (MC/BCBS only): 40   Visit #: 14 of 14    Treatment Dx: Vertigo [R42]    SUBJECTIVE  Pain Level (0-10 scale): NA  Any medication changes, allergies to medications, adverse drug reactions, diagnosis change, or new procedure performed?: [x] No    [] Yes (see summary sheet for update)  Subjective functional status/changes:   [] No changes reported  \"Sorry I am late I though the appointment was at 130. \"    OBJECTIVE      10 min Therapeutic Activity:  [x]  See flow sheet :   Rationale: improve coordination, improve balance and increase proprioception  to improve the patients ability to perform daily activities with decreased pain and symptom levels     30 min Neuromuscular Re-education:  [x]  See flow sheet :   Rationale: improve coordination, improve balance and increase proprioception  to improve the patients ability to perform daily activities with decreased pain and symptom levels        With   [] TE   [] TA   [] neuro   [] other: Patient Education: [x] Review HEP    [] Progressed/Changed HEP based on:   [] positioning   [] body mechanics   [] transfers   [] heat/ice application    [] other:      Other Objective/Functional Measures: see goals below     Pain Level (0-10 scale) post treatment: NA    ASSESSMENT/Changes in Function: Pt presented to therapy with c/c dizziness ongoing since 2021 with insidious onset. Pt has attended 14 sessions with reporting 80-90 % improvement since starting therapy. Pt no longer fearing running errands independently and able to grocery shop and walk without symptoms.  Pt is ready to be DC at this time due to progress towards goals with updated HEP given today to continue to manage symptoms and challenge balance. Thanks for the referral!      Patient will continue to benefit from skilled PT services to modify and progress therapeutic interventions, address functional mobility deficits, analyze and cue movement patterns, analyze and modify body mechanics/ergonomics, assess and modify postural abnormalities, address imbalance/dizziness and instruct in home and community integration to attain remaining goals. [x]  See Plan of Care  []  See progress note/recertification  []  See Discharge Summary         Progress towards goals / Updated goals:  Long Term Goals: LTG- To be accomplished in 6 week(s):  1.  Pt will report >/=80% improvement in symptoms to be able to complete ADLs with increased ease. Eval: episodes 2x day with spontaneous onset, losing balance   Last PN:: > 50% improvement, episodes 3-4x week versus everyday progressing well 5/2422  Current: 80-90% improvement goal MET 6/3/22     2.  Pt will be able to complete SL balance on foam EC for 10 seconds to demonstrate improved vestibular function for balance. Eval:instability with NBOS however no LOB  Last PN:: 4 seconds bilaterally progressing 5/24/22  Current: 5-6 seconds progressing 6/3/22     3.  Pt FOTO score will increase by >/= 8 points to show improvement in subjective function.   Eval:47  Last PN : 54 almost MET 5/24/22   Current: 56 goal MET 6/3/22  *FOTO score is an established functional score where 100 = no disability*    PLAN  []  Upgrade activities as tolerated     []  Continue plan of care  []  Update interventions per flow sheet       [x]  Discharge due to: progress towards goals   []  Other:Lindy Esteves 6/3/2022  7:50 AM    Future Appointments   Date Time Provider Claudine Moy   6/3/2022  1:00 PM [facility]

## 2022-06-03 NOTE — PROGRESS NOTES
[facility] at the [address], [place], [address] [facility]  Phone: [phone]      Fax:  [phone]            Discharge Summary    Patient name: Luis Alberto Holman     Start of Care: 3/22/22  Referral source: Corina Muñiz MD    : 1990  Medical/Treatment Diagnosis: Vertigo [R42]  Onset Date:2021  Prior Hospitalization: see medical history   Provider#: [id number]  Comorbidities:social drinking, depression, smoke clove cigareets 1-2x week having one, hysterectomy 2021   Prior Level of Function: , able to leave home without fear of being alone due to balance   Medications: Verified on Patient Summary List    Visits from Start of Care: 14    Missed Visits: 2    Reporting Period : 3/22/22 to 6/3/22    Goals/Measure of Progress:  Long Term Goals: LTG- To be accomplished in 6 week(s):  1.  Pt will report >/=80% improvement in symptoms to be able to complete ADLs with increased ease. Eval: episodes 2x day with spontaneous onset, losing balance   Last PN:: > 50% improvement, episodes 3-4x week versus everyday progressing well 2422  Current: 80-90% improvement goal MET 6/3/22     2.  Pt will be able to complete SL balance on foam EC for 10 seconds to demonstrate improved vestibular function for balance. Eval:instability with NBOS however no LOB  Last PN:: 4 seconds bilaterally progressing 22  Current: 5-6 seconds progressing 6/3/22     3.  Pt FOTO score will increase by >/= 8 points to show improvement in subjective function. Eval:47  Last PN : 54 almost MET 22   Current: 56 goal MET 6/3/22  *FOTO score is an established functional score where 100 = no disability*    Assessment/ Summary of Care: Pt presented to therapy with c/c dizziness ongoing since 2021 with insidious onset. Pt has attended 14 sessions with reporting 80-90 % improvement since starting therapy.  Pt no longer fearing running errands independently and able to grocery shop and walk without symptoms. Pt is ready to be DC at this time due to progress towards goals with updated HEP given today to continue to manage symptoms and challenge balance.  Thanks for the referral!    RECOMMENDATIONS:  [x]Discontinue therapy: [x]Patient has reached or is progressing toward set goals      []Patient is non-compliant or has abdicated      []Due to lack of appreciable progress towards set goals    Roosevelt EastmanRhode Island Homeopathic Hospitaloctavio 6/3/2022 7:52 AM

## 2023-03-14 ENCOUNTER — HOSPITAL ENCOUNTER (OUTPATIENT)
Facility: HOSPITAL | Age: 33
Discharge: HOME | End: 2023-03-17
Payer: COMMERCIAL

## 2023-03-14 LAB
ALBUMIN SERPL-MCNC: 3.6 G/DL (ref 3.4–5)
ALBUMIN/GLOB SERPL: 1.2 (ref 0.8–1.7)
ALP SERPL-CCNC: 56 U/L (ref 45–117)
ALT SERPL-CCNC: 20 U/L (ref 13–56)
ANION GAP SERPL CALC-SCNC: 4 MMOL/L (ref 3–18)
AST SERPL-CCNC: 12 U/L (ref 10–38)
BASOPHILS # BLD: 0.1 K/UL (ref 0–0.1)
BASOPHILS NFR BLD: 1 % (ref 0–2)
BILIRUB SERPL-MCNC: 0.2 MG/DL (ref 0.2–1)
BUN SERPL-MCNC: 11 MG/DL (ref 7–18)
BUN/CREAT SERPL: 15 (ref 12–20)
CALCIUM SERPL-MCNC: 8.7 MG/DL (ref 8.5–10.1)
CHLORIDE SERPL-SCNC: 105 MMOL/L (ref 100–111)
CO2 SERPL-SCNC: 29 MMOL/L (ref 21–32)
CREAT SERPL-MCNC: 0.72 MG/DL (ref 0.6–1.3)
DIFFERENTIAL METHOD BLD: ABNORMAL
EOSINOPHIL # BLD: 0.5 K/UL (ref 0–0.4)
EOSINOPHIL NFR BLD: 4 % (ref 0–5)
ERYTHROCYTE [DISTWIDTH] IN BLOOD BY AUTOMATED COUNT: 13.3 % (ref 11.6–14.5)
GLOBULIN SER CALC-MCNC: 3.1 G/DL (ref 2–4)
GLUCOSE SERPL-MCNC: 98 MG/DL (ref 74–99)
HCT VFR BLD AUTO: 38.5 % (ref 35–45)
HGB BLD-MCNC: 12.2 G/DL (ref 12–16)
IMM GRANULOCYTES # BLD AUTO: 0 K/UL (ref 0–0.04)
IMM GRANULOCYTES NFR BLD AUTO: 0 % (ref 0–0.5)
LYMPHOCYTES # BLD: 3.1 K/UL (ref 0.9–3.6)
LYMPHOCYTES NFR BLD: 28 % (ref 21–52)
MCH RBC QN AUTO: 28.5 PG (ref 24–34)
MCHC RBC AUTO-ENTMCNC: 31.7 G/DL (ref 31–37)
MCV RBC AUTO: 90 FL (ref 78–100)
MONOCYTES # BLD: 0.8 K/UL (ref 0.05–1.2)
MONOCYTES NFR BLD: 7 % (ref 3–10)
NEUTS SEG # BLD: 6.8 K/UL (ref 1.8–8)
NEUTS SEG NFR BLD: 60 % (ref 40–73)
NRBC # BLD: 0 K/UL (ref 0–0.01)
NRBC BLD-RTO: 0 PER 100 WBC
PLATELET # BLD AUTO: 300 K/UL (ref 135–420)
PMV BLD AUTO: 10.9 FL (ref 9.2–11.8)
POTASSIUM SERPL-SCNC: 4.3 MMOL/L (ref 3.5–5.5)
PROT SERPL-MCNC: 6.7 G/DL (ref 6.4–8.2)
RBC # BLD AUTO: 4.28 M/UL (ref 4.2–5.3)
SODIUM SERPL-SCNC: 138 MMOL/L (ref 136–145)
WBC # BLD AUTO: 11.3 K/UL (ref 4.6–13.2)

## 2023-03-14 PROCEDURE — 85025 COMPLETE CBC W/AUTO DIFF WBC: CPT

## 2023-03-14 PROCEDURE — 80053 COMPREHEN METABOLIC PANEL: CPT

## 2023-03-14 PROCEDURE — 80183 DRUG SCRN QUANT OXCARBAZEPIN: CPT

## 2023-03-14 PROCEDURE — 36415 COLL VENOUS BLD VENIPUNCTURE: CPT

## 2023-03-16 LAB — OXCARBAZEPINE SERPL-MCNC: 18 UG/ML (ref 10–35)

## 2023-03-20 LAB
FAX TO NUMBER: NORMAL
TEST RESULTS FAXED TO: NORMAL